# Patient Record
Sex: MALE | Race: WHITE | NOT HISPANIC OR LATINO | URBAN - METROPOLITAN AREA
[De-identification: names, ages, dates, MRNs, and addresses within clinical notes are randomized per-mention and may not be internally consistent; named-entity substitution may affect disease eponyms.]

---

## 2017-02-06 ENCOUNTER — OUTPATIENT (OUTPATIENT)
Dept: OUTPATIENT SERVICES | Facility: HOSPITAL | Age: 57
LOS: 1 days | Discharge: HOME | End: 2017-02-06

## 2017-06-06 ENCOUNTER — APPOINTMENT (OUTPATIENT)
Dept: HEMATOLOGY ONCOLOGY | Facility: CLINIC | Age: 57
End: 2017-06-06

## 2017-06-06 ENCOUNTER — RESULT REVIEW (OUTPATIENT)
Age: 57
End: 2017-06-06

## 2017-06-06 ENCOUNTER — OUTPATIENT (OUTPATIENT)
Dept: OUTPATIENT SERVICES | Facility: HOSPITAL | Age: 57
LOS: 1 days | Discharge: HOME | End: 2017-06-06

## 2017-06-06 VITALS
RESPIRATION RATE: 14 BRPM | BODY MASS INDEX: 25.58 KG/M2 | WEIGHT: 162.99 LBS | TEMPERATURE: 97.6 F | HEART RATE: 69 BPM | DIASTOLIC BLOOD PRESSURE: 78 MMHG | SYSTOLIC BLOOD PRESSURE: 130 MMHG | HEIGHT: 67 IN

## 2017-06-06 DIAGNOSIS — I82.409 ACUTE EMBOLISM AND THROMBOSIS OF UNSPECIFIED DEEP VEINS OF UNSPECIFIED LOWER EXTREMITY: ICD-10-CM

## 2017-06-07 LAB
ANION GAP SERPL CALC-SCNC: 11 MEQ/L
BASOPHILS # BLD: 0.03 TH/MM3
BASOPHILS NFR BLD: 0.4 %
BUN SERPL-MCNC: 15 MG/DL
BUN/CREAT SERPL: 16.7 %
CALCIUM SERPL-MCNC: 9.3 MG/DL
CHLORIDE SERPL-SCNC: 102 MEQ/L
CO2 SERPL-SCNC: 29 MEQ/L
CREAT SERPL-MCNC: 0.9 MG/DL
EOSINOPHIL # BLD: 0.1 TH/MM3
EOSINOPHIL NFR BLD: 1.3 %
ERYTHROCYTE [DISTWIDTH] IN BLOOD BY AUTOMATED COUNT: 13 %
GFR SERPL CREATININE-BSD FRML MDRD: 87
GLUCOSE SERPL-MCNC: 76 MG/DL
GRANULOCYTES # BLD: 4.71 TH/MM3
GRANULOCYTES NFR BLD: 62.8 %
HCT VFR BLD AUTO: 44 %
HGB BLD-MCNC: 15.7 G/DL
IMM GRANULOCYTES # BLD: 0.01 TH/MM3
IMM GRANULOCYTES NFR BLD: 0.1 %
LYMPHOCYTES # BLD: 1.9 TH/MM3
LYMPHOCYTES NFR BLD: 25.3 %
MCH RBC QN AUTO: 31.7 PG
MCHC RBC AUTO-ENTMCNC: 35.7 G/DL
MCV RBC AUTO: 88.7 FL
MONOCYTES # BLD: 0.76 TH/MM3
MONOCYTES NFR BLD: 10.1 %
PLATELET # BLD: 189 TH/MM3
PMV BLD AUTO: 10.3 FL
POTASSIUM SERPL-SCNC: 3.8 MMOL/L
RBC # BLD AUTO: 4.96 MIL/MM3
SODIUM SERPL-SCNC: 142 MEQ/L
WBC # BLD: 7.51 TH/MM3

## 2017-06-28 DIAGNOSIS — I63.9 CEREBRAL INFARCTION, UNSPECIFIED: ICD-10-CM

## 2017-07-05 ENCOUNTER — APPOINTMENT (OUTPATIENT)
Dept: CARDIOTHORACIC SURGERY | Facility: CLINIC | Age: 57
End: 2017-07-05

## 2017-07-12 ENCOUNTER — APPOINTMENT (OUTPATIENT)
Dept: CARDIOTHORACIC SURGERY | Facility: CLINIC | Age: 57
End: 2017-07-12

## 2017-09-13 ENCOUNTER — OUTPATIENT (OUTPATIENT)
Dept: OUTPATIENT SERVICES | Facility: HOSPITAL | Age: 57
LOS: 1 days | Discharge: HOME | End: 2017-09-13

## 2017-09-13 DIAGNOSIS — Q21.1 ATRIAL SEPTAL DEFECT: ICD-10-CM

## 2017-09-13 DIAGNOSIS — I82.409 ACUTE EMBOLISM AND THROMBOSIS OF UNSPECIFIED DEEP VEINS OF UNSPECIFIED LOWER EXTREMITY: ICD-10-CM

## 2017-09-13 DIAGNOSIS — Z01.818 ENCOUNTER FOR OTHER PREPROCEDURAL EXAMINATION: ICD-10-CM

## 2017-09-13 DIAGNOSIS — Z00.00 ENCOUNTER FOR GENERAL ADULT MEDICAL EXAMINATION WITHOUT ABNORMAL FINDINGS: ICD-10-CM

## 2017-09-26 ENCOUNTER — APPOINTMENT (OUTPATIENT)
Dept: CARDIOTHORACIC SURGERY | Facility: HOSPITAL | Age: 57
End: 2017-09-26
Payer: COMMERCIAL

## 2017-09-26 ENCOUNTER — INPATIENT (INPATIENT)
Facility: HOSPITAL | Age: 57
LOS: 0 days | Discharge: HOME | DRG: 229 | End: 2017-09-27
Attending: INTERNAL MEDICINE | Admitting: INTERNAL MEDICINE
Payer: COMMERCIAL

## 2017-09-26 VITALS — TEMPERATURE: 97 F

## 2017-09-26 LAB
ALBUMIN SERPL ELPH-MCNC: 3.8 G/DL — SIGNIFICANT CHANGE UP (ref 3.3–5)
ALBUMIN SERPL ELPH-MCNC: 4.2 G/DL — SIGNIFICANT CHANGE UP (ref 3.3–5)
ALP SERPL-CCNC: 65 U/L — SIGNIFICANT CHANGE UP (ref 40–120)
ALP SERPL-CCNC: 68 U/L — SIGNIFICANT CHANGE UP (ref 40–120)
ALT FLD-CCNC: 30 U/L — SIGNIFICANT CHANGE UP (ref 10–45)
ALT FLD-CCNC: 33 U/L — SIGNIFICANT CHANGE UP (ref 10–45)
ANION GAP SERPL CALC-SCNC: 11 MMOL/L — SIGNIFICANT CHANGE UP (ref 5–17)
ANION GAP SERPL CALC-SCNC: 11 MMOL/L — SIGNIFICANT CHANGE UP (ref 5–17)
APTT BLD: 30.2 SEC — SIGNIFICANT CHANGE UP (ref 27.5–37.4)
APTT BLD: 35.6 SEC — SIGNIFICANT CHANGE UP (ref 27.5–37.4)
AST SERPL-CCNC: 23 U/L — SIGNIFICANT CHANGE UP (ref 10–40)
AST SERPL-CCNC: 25 U/L — SIGNIFICANT CHANGE UP (ref 10–40)
BASOPHILS NFR BLD AUTO: 0.1 % — SIGNIFICANT CHANGE UP (ref 0–2)
BILIRUB SERPL-MCNC: 0.9 MG/DL — SIGNIFICANT CHANGE UP (ref 0.2–1.2)
BILIRUB SERPL-MCNC: 1 MG/DL — SIGNIFICANT CHANGE UP (ref 0.2–1.2)
BLD GP AB SCN SERPL QL: NEGATIVE — SIGNIFICANT CHANGE UP
BUN SERPL-MCNC: 13 MG/DL — SIGNIFICANT CHANGE UP (ref 7–23)
BUN SERPL-MCNC: 14 MG/DL — SIGNIFICANT CHANGE UP (ref 7–23)
CALCIUM SERPL-MCNC: 8.8 MG/DL — SIGNIFICANT CHANGE UP (ref 8.4–10.5)
CALCIUM SERPL-MCNC: 9 MG/DL — SIGNIFICANT CHANGE UP (ref 8.4–10.5)
CHLORIDE SERPL-SCNC: 104 MMOL/L — SIGNIFICANT CHANGE UP (ref 96–108)
CHLORIDE SERPL-SCNC: 105 MMOL/L — SIGNIFICANT CHANGE UP (ref 96–108)
CO2 SERPL-SCNC: 25 MMOL/L — SIGNIFICANT CHANGE UP (ref 22–31)
CO2 SERPL-SCNC: 25 MMOL/L — SIGNIFICANT CHANGE UP (ref 22–31)
CREAT SERPL-MCNC: 0.93 MG/DL — SIGNIFICANT CHANGE UP (ref 0.5–1.3)
CREAT SERPL-MCNC: 0.95 MG/DL — SIGNIFICANT CHANGE UP (ref 0.5–1.3)
EOSINOPHIL NFR BLD AUTO: 0.7 % — SIGNIFICANT CHANGE UP (ref 0–6)
GLUCOSE SERPL-MCNC: 122 MG/DL — HIGH (ref 70–99)
GLUCOSE SERPL-MCNC: 134 MG/DL — HIGH (ref 70–99)
HBA1C BLD-MCNC: 5.1 % — SIGNIFICANT CHANGE UP (ref 4–5.6)
HCT VFR BLD CALC: 39.5 % — SIGNIFICANT CHANGE UP (ref 39–50)
HCT VFR BLD CALC: 42.3 % — SIGNIFICANT CHANGE UP (ref 39–50)
HGB BLD-MCNC: 14 G/DL — SIGNIFICANT CHANGE UP (ref 13–17)
HGB BLD-MCNC: 15 G/DL — SIGNIFICANT CHANGE UP (ref 13–17)
INR BLD: 1.09 — SIGNIFICANT CHANGE UP (ref 0.88–1.16)
INR BLD: 1.12 — SIGNIFICANT CHANGE UP (ref 0.88–1.16)
LYMPHOCYTES # BLD AUTO: 19.4 % — SIGNIFICANT CHANGE UP (ref 13–44)
MAGNESIUM SERPL-MCNC: 1.9 MG/DL — SIGNIFICANT CHANGE UP (ref 1.6–2.6)
MAGNESIUM SERPL-MCNC: 1.9 MG/DL — SIGNIFICANT CHANGE UP (ref 1.6–2.6)
MCHC RBC-ENTMCNC: 31.6 PG — SIGNIFICANT CHANGE UP (ref 27–34)
MCHC RBC-ENTMCNC: 32.1 PG — SIGNIFICANT CHANGE UP (ref 27–34)
MCHC RBC-ENTMCNC: 35.4 G/DL — SIGNIFICANT CHANGE UP (ref 32–36)
MCHC RBC-ENTMCNC: 35.5 G/DL — SIGNIFICANT CHANGE UP (ref 32–36)
MCV RBC AUTO: 89.2 FL — SIGNIFICANT CHANGE UP (ref 80–100)
MCV RBC AUTO: 90.6 FL — SIGNIFICANT CHANGE UP (ref 80–100)
MONOCYTES NFR BLD AUTO: 6.1 % — SIGNIFICANT CHANGE UP (ref 2–14)
NEUTROPHILS NFR BLD AUTO: 73.7 % — SIGNIFICANT CHANGE UP (ref 43–77)
NT-PROBNP SERPL-SCNC: 19 PG/ML — SIGNIFICANT CHANGE UP (ref 0–300)
PHOSPHATE SERPL-MCNC: 4.1 MG/DL — SIGNIFICANT CHANGE UP (ref 2.5–4.5)
PLATELET # BLD AUTO: 132 K/UL — LOW (ref 150–400)
PLATELET # BLD AUTO: 151 K/UL — SIGNIFICANT CHANGE UP (ref 150–400)
POTASSIUM SERPL-MCNC: 4 MMOL/L — SIGNIFICANT CHANGE UP (ref 3.5–5.3)
POTASSIUM SERPL-MCNC: 4 MMOL/L — SIGNIFICANT CHANGE UP (ref 3.5–5.3)
POTASSIUM SERPL-SCNC: 4 MMOL/L — SIGNIFICANT CHANGE UP (ref 3.5–5.3)
POTASSIUM SERPL-SCNC: 4 MMOL/L — SIGNIFICANT CHANGE UP (ref 3.5–5.3)
PROT SERPL-MCNC: 6.3 G/DL — SIGNIFICANT CHANGE UP (ref 6–8.3)
PROT SERPL-MCNC: 7.1 G/DL — SIGNIFICANT CHANGE UP (ref 6–8.3)
PROTHROM AB SERPL-ACNC: 12.1 SEC — SIGNIFICANT CHANGE UP (ref 9.8–12.7)
PROTHROM AB SERPL-ACNC: 12.5 SEC — SIGNIFICANT CHANGE UP (ref 9.8–12.7)
RBC # BLD: 4.43 M/UL — SIGNIFICANT CHANGE UP (ref 4.2–5.8)
RBC # BLD: 4.67 M/UL — SIGNIFICANT CHANGE UP (ref 4.2–5.8)
RBC # FLD: 12.4 % — SIGNIFICANT CHANGE UP (ref 10.3–16.9)
RBC # FLD: 12.7 % — SIGNIFICANT CHANGE UP (ref 10.3–16.9)
RH IG SCN BLD-IMP: POSITIVE — SIGNIFICANT CHANGE UP
SODIUM SERPL-SCNC: 140 MMOL/L — SIGNIFICANT CHANGE UP (ref 135–145)
SODIUM SERPL-SCNC: 141 MMOL/L — SIGNIFICANT CHANGE UP (ref 135–145)
TSH SERPL-MCNC: 0.84 UIU/ML — SIGNIFICANT CHANGE UP (ref 0.35–4.94)
WBC # BLD: 5.2 K/UL — SIGNIFICANT CHANGE UP (ref 3.8–10.5)
WBC # BLD: 7.7 K/UL — SIGNIFICANT CHANGE UP (ref 3.8–10.5)
WBC # FLD AUTO: 5.2 K/UL — SIGNIFICANT CHANGE UP (ref 3.8–10.5)
WBC # FLD AUTO: 7.7 K/UL — SIGNIFICANT CHANGE UP (ref 3.8–10.5)

## 2017-09-26 PROCEDURE — 93580 TRANSCATH CLOSURE OF ASD: CPT

## 2017-09-26 PROCEDURE — 71010: CPT | Mod: 26

## 2017-09-26 PROCEDURE — 93010 ELECTROCARDIOGRAM REPORT: CPT

## 2017-09-26 RX ORDER — HEPARIN SODIUM 5000 [USP'U]/ML
5000 INJECTION INTRAVENOUS; SUBCUTANEOUS ONCE
Qty: 0 | Refills: 0 | Status: COMPLETED | OUTPATIENT
Start: 2017-09-26 | End: 2017-09-26

## 2017-09-26 RX ORDER — CLOPIDOGREL BISULFATE 75 MG/1
300 TABLET, FILM COATED ORAL ONCE
Qty: 0 | Refills: 0 | Status: COMPLETED | OUTPATIENT
Start: 2017-09-26 | End: 2017-09-26

## 2017-09-26 RX ORDER — SODIUM CHLORIDE 9 MG/ML
1000 INJECTION, SOLUTION INTRAVENOUS
Qty: 0 | Refills: 0 | Status: DISCONTINUED | OUTPATIENT
Start: 2017-09-26 | End: 2017-09-27

## 2017-09-26 RX ORDER — FAMOTIDINE 10 MG/ML
20 INJECTION INTRAVENOUS
Qty: 0 | Refills: 0 | Status: DISCONTINUED | OUTPATIENT
Start: 2017-09-26 | End: 2017-09-27

## 2017-09-26 RX ORDER — MEPERIDINE HYDROCHLORIDE 50 MG/ML
25 INJECTION INTRAMUSCULAR; INTRAVENOUS; SUBCUTANEOUS ONCE
Qty: 0 | Refills: 0 | Status: DISCONTINUED | OUTPATIENT
Start: 2017-09-26 | End: 2017-09-27

## 2017-09-26 RX ORDER — HEPARIN SODIUM 5000 [USP'U]/ML
5000 INJECTION INTRAVENOUS; SUBCUTANEOUS EVERY 8 HOURS
Qty: 0 | Refills: 0 | Status: DISCONTINUED | OUTPATIENT
Start: 2017-09-27 | End: 2017-09-27

## 2017-09-26 RX ORDER — MAGNESIUM OXIDE 400 MG ORAL TABLET 241.3 MG
400 TABLET ORAL ONCE
Qty: 0 | Refills: 0 | Status: COMPLETED | OUTPATIENT
Start: 2017-09-26 | End: 2017-09-26

## 2017-09-26 RX ORDER — ASPIRIN/CALCIUM CARB/MAGNESIUM 324 MG
81 TABLET ORAL DAILY
Qty: 0 | Refills: 0 | Status: DISCONTINUED | OUTPATIENT
Start: 2017-09-27 | End: 2017-09-27

## 2017-09-26 RX ORDER — CLOPIDOGREL BISULFATE 75 MG/1
75 TABLET, FILM COATED ORAL DAILY
Qty: 0 | Refills: 0 | Status: DISCONTINUED | OUTPATIENT
Start: 2017-09-27 | End: 2017-09-27

## 2017-09-26 RX ORDER — ASPIRIN/CALCIUM CARB/MAGNESIUM 324 MG
325 TABLET ORAL ONCE
Qty: 0 | Refills: 0 | Status: COMPLETED | OUTPATIENT
Start: 2017-09-26 | End: 2017-09-26

## 2017-09-26 RX ORDER — CEFAZOLIN SODIUM 1 G
2000 VIAL (EA) INJECTION EVERY 8 HOURS
Qty: 0 | Refills: 0 | Status: COMPLETED | OUTPATIENT
Start: 2017-09-26 | End: 2017-09-27

## 2017-09-26 RX ADMIN — CLOPIDOGREL BISULFATE 300 MILLIGRAM(S): 75 TABLET, FILM COATED ORAL at 15:00

## 2017-09-26 RX ADMIN — FAMOTIDINE 20 MILLIGRAM(S): 10 INJECTION INTRAVENOUS at 18:29

## 2017-09-26 RX ADMIN — Medication 325 MILLIGRAM(S): at 15:00

## 2017-09-26 RX ADMIN — Medication 100 MILLIGRAM(S): at 18:36

## 2017-09-26 RX ADMIN — HEPARIN SODIUM 5000 UNIT(S): 5000 INJECTION INTRAVENOUS; SUBCUTANEOUS at 22:48

## 2017-09-26 RX ADMIN — MAGNESIUM OXIDE 400 MG ORAL TABLET 400 MILLIGRAM(S): 241.3 TABLET ORAL at 18:29

## 2017-09-26 NOTE — PROGRESS NOTE ADULT - SUBJECTIVE AND OBJECTIVE BOX
Post operative progress note     Operation / Date:  9/26/17 PFO closure     SUBJECTIVE ASSESSMENT:  Patient seen in cath lab holding this afternoon, patient states he is sleeping but overall feeling ok, denies any chest pain, shortness of breath or palpitations.     Vital Signs Last 24 Hrs  T(C): --  T(F): --  HR: --  BP: --  BP(mean): --  RR: --  SpO2: --  I&O's Detail      CHEST TUBE:  No  DHAVAL DRAIN: No  EPICARDIAL WIRES: No  TIE DOWNS: Yes   AGUAYO: No    PHYSICAL EXAM:    General: Patient lying comfortably in bed, no acute distress     Neurological: Alert and oriented. Unable to assess LE weakness/strength 2/2 bedrest post cath. No focal neurological deficits noted     Cardiovascular: S1S2, RRR, no murmurs appreciated on exam     Respiratory: Apices clear to ausculation, unable to assess posterior lung fields 2/2 bed rest     Gastrointestinal: Abdomen soft, non tender, non distended.      Extremities: Warm and well perfused. No edema or calf tenderness bilaterally     Vascular: Peripheral pulses 2+ bilaterally     Incision Sites: right groin incision with dressing intact, clean and dry. No hematoma.     LABS:                        15.0   5.2   )-----------( 151      ( 26 Sep 2017 10:11 )             42.3       COUMADIN:  No    PT/INR - ( 26 Sep 2017 10:11 )   PT: 12.1 sec;   INR: 1.09          PTT - ( 26 Sep 2017 10:11 )  PTT:30.2 sec    09-26    140  |  104  |  14  ----------------------------<  122<H>  4.0   |  25  |  0.95    Ca    9.0      26 Sep 2017 10:11  Mg     1.9     09-26    TPro  7.1  /  Alb  4.2  /  TBili  1.0  /  DBili  x   /  AST  25  /  ALT  33  /  AlkPhos  68  09-26    MEDICATIONS  (STANDING):  sodium chloride 0.45%. 1000 milliLiter(s) (10 mL/Hr) IV Continuous <Continuous>  ceFAZolin   IVPB 2000 milliGRAM(s) IV Intermittent every 8 hours  famotidine    Tablet 20 milliGRAM(s) Oral two times a day  aspirin enteric coated 325 milliGRAM(s) Oral once  clopidogrel Tablet 300 milliGRAM(s) Oral once    MEDICATIONS  (PRN):  meperidine     Injectable 25 milliGRAM(s) IV Push once PRN For Shivering    RADIOLOGY & ADDITIONAL TESTS:  9/26/17 CXR: pending official read, clear lungs     A/P: 56 year old male, with no significant past medical history prior to recent CVA with bilateral pulmonary emboli s/p tPA and attempted embolectomy in April 2016 with subsequent workup revealing a patent foramen ovale evaluated by Dr. Keating as an outpatient and presented today for elective procedure. Patient underwent PFO closure, procedure uncomplicated. Patient evaluated in cath lab holding, doing well.     Neurovascular: Hx of CVA with no residual deficits   - no active issues     Cardiovascular: Hemodynamically stable. HR controlled.  - s/p PFO closure, as per Dr. Keating, asa 325 and plavix 300mg today will begin asa 81mg and plavix 75mg tomorrow.     Respiratory:   - Encourage C+DB and Use of IS 10x / hr while awake.  - CXR stable, f./u CXR in AM   - Bilateral PE, on pradaxa at home, will confirm with Dr. Keating when to resume.     GI: Stable.  - PO diet   - pepcid 20mg for GI ppx     Renal / :   -Monitor renal function.  -Monitor I/O's.    Endocrine:    -A1c.  -TSH.    Hematologic:  -CBC.  -Coagulation Panel.    ID: continue periop abx     Prophylaxis:  -DVT prophylaxis with 5000 SubQ Heparin q8h to start tomorrow   -SCD's    Disposition: Admit to 9L post procedure.

## 2017-09-26 NOTE — H&P ADULT - NSHPREVIEWOFSYSTEMS_GEN_ALL_CORE
Constitutional: feeling fatigued, but no fever, no headache, and no chills  Cardiovascular: no shortness of breath, no dyspnea during exertion, no chest pressure, no chest pain, no extremity edema, no intermittent leg claudication and no palpitation  Eyes, ENT, Respiratory, GI, , Integumentary, Neurological and Psychiatric are otherwise negative.

## 2017-09-26 NOTE — BRIEF OPERATIVE NOTE - PROCEDURE
<<-----Click on this checkbox to enter Procedure Patent foramen ovale repair  09/26/2017    Active  CKLIGER

## 2017-09-26 NOTE — H&P ADULT - NSHPPHYSICALEXAM_GEN_ALL_CORE
Constitutional: normal appearance and no acute distress    Neck: normal jugular venous A waves present, normal jugular venous V waves present and no jugular sinha A waves    Pulmonary:  no respiratory distress, normal respiratory rhythm and effort, no accessory muscle use and lungs were clear to auscultation bilaterally    Cardiovascular:  heart rate and rhythm were normal, normal S1 and S2, no murmurs present, the arterial pulses were normal and no peripheral edema were present.    Abdomen: normal bowel sounds, soft and non-tender    MSK: normal gait    Psych: oriented to person, place, and time

## 2017-09-26 NOTE — H&P ADULT - HISTORY OF PRESENT ILLNESS
56 y/oM with no signif medical hx with a recent CVA with bilateral pulmonary emboli s/p tPA and attempted embolectomy in April 2016 at [facility] with subsequent workup revealing a patent foramen ovale who presents for follow up.    Since his last visit, the patient was evaluated with Dr. Mane.  He has recommended continuing the PRadaxa indefinitly.  the patient has had an extensive genetic and acquired thrombophilic and hypercoagulable workup that was completely negative.    the patient is feeling well.  he has no residual neuro deficits.  he denies chest pain, SOB, STEVEN, orthopnea, PND, dizziness, syncope, LE edema, and palpitations.  On morning of procedure he is in his usual state of health

## 2017-09-26 NOTE — H&P ADULT - NSHPLABSRESULTS_GEN_ALL_CORE
A/P: 55y/o M with no significant medical history with a recent CVA with bilateral pumonary emboli s/p tPA and attempted embolectomy in April 2016 at [facility] with subsequent workup revealing a patent formaen ovale who presents for PFO closure   - pt last took pradaxa on Saturday night and has been on lovenox bridge since   - type and screen x 2   - blood and products on hold for OR   - full set of labs and EKG

## 2017-09-26 NOTE — BRIEF OPERATIVE NOTE - OPERATION/FINDINGS
10F long rt CFV, 9F short rt CFV sheaths  venogram without evidence of iliofemoral/IVC thrombus/filling defect  ICE performed with 4-5mm small ASD/PFO visualized, rt-lt shunting  25mm PFO Amplatzer Occluder device positioned/deployed across the PFO  bubble study revealed no shunt  hemostasis via mattress suture  imp: cryptogenic CVA; PFO s/p successful closure with 25mm PFO Amplatzer Occluder  plan:  -routine post-catheterization care  -asa 325mg and plavix 300mg today, then asa 81mg, plavix 75mg daily  -d/c anticoagulation  -TTE tomorrow am  -IV abx per protocol

## 2017-09-26 NOTE — BRIEF OPERATIVE NOTE - PRE-OP DX
Cryptogenic stroke  09/26/2017    Active  Nikos Keating  Patent foramen ovale with right to left shunt  09/26/2017    Nikos Flores

## 2017-09-26 NOTE — H&P ADULT - PMH
Cerebrovascular accident (CVA) due to embolism    PE (pulmonary thromboembolism)    PFO (patent foramen ovale)

## 2017-09-27 ENCOUNTER — TRANSCRIPTION ENCOUNTER (OUTPATIENT)
Age: 57
End: 2017-09-27

## 2017-09-27 VITALS — DIASTOLIC BLOOD PRESSURE: 66 MMHG | HEART RATE: 68 BPM | SYSTOLIC BLOOD PRESSURE: 101 MMHG

## 2017-09-27 LAB
ANION GAP SERPL CALC-SCNC: 12 MMOL/L — SIGNIFICANT CHANGE UP (ref 5–17)
BUN SERPL-MCNC: 16 MG/DL — SIGNIFICANT CHANGE UP (ref 7–23)
CALCIUM SERPL-MCNC: 8.8 MG/DL — SIGNIFICANT CHANGE UP (ref 8.4–10.5)
CHLORIDE SERPL-SCNC: 103 MMOL/L — SIGNIFICANT CHANGE UP (ref 96–108)
CO2 SERPL-SCNC: 25 MMOL/L — SIGNIFICANT CHANGE UP (ref 22–31)
CREAT SERPL-MCNC: 1.05 MG/DL — SIGNIFICANT CHANGE UP (ref 0.5–1.3)
GLUCOSE SERPL-MCNC: 109 MG/DL — HIGH (ref 70–99)
HCT VFR BLD CALC: 40 % — SIGNIFICANT CHANGE UP (ref 39–50)
HGB BLD-MCNC: 14.3 G/DL — SIGNIFICANT CHANGE UP (ref 13–17)
MAGNESIUM SERPL-MCNC: 2.2 MG/DL — SIGNIFICANT CHANGE UP (ref 1.6–2.6)
MCHC RBC-ENTMCNC: 32.3 PG — SIGNIFICANT CHANGE UP (ref 27–34)
MCHC RBC-ENTMCNC: 35.8 G/DL — SIGNIFICANT CHANGE UP (ref 32–36)
MCV RBC AUTO: 90.3 FL — SIGNIFICANT CHANGE UP (ref 80–100)
PLATELET # BLD AUTO: 138 K/UL — LOW (ref 150–400)
POTASSIUM SERPL-MCNC: 4 MMOL/L — SIGNIFICANT CHANGE UP (ref 3.5–5.3)
POTASSIUM SERPL-SCNC: 4 MMOL/L — SIGNIFICANT CHANGE UP (ref 3.5–5.3)
RBC # BLD: 4.43 M/UL — SIGNIFICANT CHANGE UP (ref 4.2–5.8)
RBC # FLD: 12.8 % — SIGNIFICANT CHANGE UP (ref 10.3–16.9)
SODIUM SERPL-SCNC: 140 MMOL/L — SIGNIFICANT CHANGE UP (ref 135–145)
WBC # BLD: 6.8 K/UL — SIGNIFICANT CHANGE UP (ref 3.8–10.5)
WBC # FLD AUTO: 6.8 K/UL — SIGNIFICANT CHANGE UP (ref 3.8–10.5)

## 2017-09-27 PROCEDURE — 36415 COLL VENOUS BLD VENIPUNCTURE: CPT

## 2017-09-27 PROCEDURE — 83036 HEMOGLOBIN GLYCOSYLATED A1C: CPT

## 2017-09-27 PROCEDURE — C1759: CPT

## 2017-09-27 PROCEDURE — 86850 RBC ANTIBODY SCREEN: CPT

## 2017-09-27 PROCEDURE — 93010 ELECTROCARDIOGRAM REPORT: CPT

## 2017-09-27 PROCEDURE — 85610 PROTHROMBIN TIME: CPT

## 2017-09-27 PROCEDURE — 93306 TTE W/DOPPLER COMPLETE: CPT

## 2017-09-27 PROCEDURE — 84443 ASSAY THYROID STIM HORMONE: CPT

## 2017-09-27 PROCEDURE — 85027 COMPLETE CBC AUTOMATED: CPT

## 2017-09-27 PROCEDURE — C1887: CPT

## 2017-09-27 PROCEDURE — C1894: CPT

## 2017-09-27 PROCEDURE — 71010: CPT | Mod: 26

## 2017-09-27 PROCEDURE — C1817: CPT

## 2017-09-27 PROCEDURE — 80053 COMPREHEN METABOLIC PANEL: CPT

## 2017-09-27 PROCEDURE — 85025 COMPLETE CBC W/AUTO DIFF WBC: CPT

## 2017-09-27 PROCEDURE — 83880 ASSAY OF NATRIURETIC PEPTIDE: CPT

## 2017-09-27 PROCEDURE — 86901 BLOOD TYPING SEROLOGIC RH(D): CPT

## 2017-09-27 PROCEDURE — 86900 BLOOD TYPING SEROLOGIC ABO: CPT

## 2017-09-27 PROCEDURE — C1769: CPT

## 2017-09-27 PROCEDURE — 93306 TTE W/DOPPLER COMPLETE: CPT | Mod: 26

## 2017-09-27 PROCEDURE — 93005 ELECTROCARDIOGRAM TRACING: CPT

## 2017-09-27 PROCEDURE — 83735 ASSAY OF MAGNESIUM: CPT

## 2017-09-27 PROCEDURE — 84100 ASSAY OF PHOSPHORUS: CPT

## 2017-09-27 PROCEDURE — 85730 THROMBOPLASTIN TIME PARTIAL: CPT

## 2017-09-27 PROCEDURE — 71045 X-RAY EXAM CHEST 1 VIEW: CPT

## 2017-09-27 PROCEDURE — 86923 COMPATIBILITY TEST ELECTRIC: CPT

## 2017-09-27 PROCEDURE — 80048 BASIC METABOLIC PNL TOTAL CA: CPT

## 2017-09-27 RX ORDER — DABIGATRAN ETEXILATE MESYLATE 150 MG/1
1 CAPSULE ORAL
Qty: 0 | Refills: 0 | COMMUNITY

## 2017-09-27 RX ORDER — CLOPIDOGREL BISULFATE 75 MG/1
1 TABLET, FILM COATED ORAL
Qty: 30 | Refills: 0
Start: 2017-09-27 | End: 2017-10-27

## 2017-09-27 RX ORDER — ASPIRIN/CALCIUM CARB/MAGNESIUM 324 MG
1 TABLET ORAL
Qty: 30 | Refills: 0
Start: 2017-09-27 | End: 2017-10-27

## 2017-09-27 RX ORDER — FAMOTIDINE 10 MG/ML
1 INJECTION INTRAVENOUS
Qty: 60 | Refills: 0
Start: 2017-09-27 | End: 2017-10-27

## 2017-09-27 RX ADMIN — Medication 81 MILLIGRAM(S): at 12:04

## 2017-09-27 RX ADMIN — CLOPIDOGREL BISULFATE 75 MILLIGRAM(S): 75 TABLET, FILM COATED ORAL at 12:04

## 2017-09-27 RX ADMIN — Medication 100 MILLIGRAM(S): at 03:39

## 2017-09-27 RX ADMIN — HEPARIN SODIUM 5000 UNIT(S): 5000 INJECTION INTRAVENOUS; SUBCUTANEOUS at 06:17

## 2017-09-27 RX ADMIN — FAMOTIDINE 20 MILLIGRAM(S): 10 INJECTION INTRAVENOUS at 06:17

## 2017-09-27 NOTE — DISCHARGE NOTE ADULT - HOSPITAL COURSE
56 year old male, no significant past medical history prior to recent CVA with bilateral pulmonary emboli s/p tPA and attempted embolectomy  4/2016 with subsequent     56 year old male, with no significant past medical history prior to recent CVA with bilateral pulmonary emboli s/p tPA and attempted embolectomy in April 2016 with subsequent workup revealing a patent foramen ovale evaluated by Dr. Keating as an outpatient and presented today for elective procedure. Patient underwent PFO closure, procedure uncomplicated. Patient evaluated in cath lab holding, doing well. 56 year old male, no significant past medical history prior to recent CVA with bilateral pulmonary emboli s/p tPA and attempted embolectomy  4/2016 at [facility].  His workup revealed a patent foramen ovale and he was evaluated by Dr. Keating as an outpatient.  He presented to [facility] on 9/26/17 for elective PFO closure.  Procedure was uncomplicated.  Patient brought from cath lab holding to 9[facility] POD0.  Today POD1, patient is ambulating in hallway, and tolerating PO diet, with no acute complaints.  As per Dr. Keating, patient ready for discharge. 56 year old male, no significant past medical history prior to recent CVA with bilateral pulmonary emboli s/p tPA and attempted embolectomy  4/2016 at [facility].  His workup revealed a patent foramen ovale and he was evaluated by Dr. Keating as an outpatient.  He presented to [facility] on 9/26/17 for elective PFO closure.  Procedure was uncomplicated.  Patient brought from cath lab holding to 9[facility] POD0.  Today POD1, patient is ambulating in hallway, and tolerating PO diet, with no acute complaints.  As per Dr. Keating, patient ready for discharge. Hypercoaguable workup was completely negative; discussed with Dr. Mane. Plan for DAPT post-closure for 6mo then ASA monotherapy thereafter.

## 2017-09-27 NOTE — DISCHARGE NOTE ADULT - CARE PROVIDER_API CALL
Nikos Keating), Cardiology; Internal Medicine; Interventional Cardiology  [address]  4th [facility], [place] [address]  Phone: [phone]  Fax: [phone]

## 2017-09-27 NOTE — DISCHARGE NOTE ADULT - CARE PROVIDERS DIRECT ADDRESSES
[email or web address]

## 2017-09-27 NOTE — DISCHARGE NOTE ADULT - PLAN OF CARE
to recover from surgery -Please follow up with Dr. Keating on 10/9/17 at 10:30am. The office is located at [facility], [facility], 4th floor. Call us with any questions  #[phone].    -Walk daily as tolerated and use your incentive spirometer every hour.    -No driving or strenuous activity/exercise for 6 weeks, or until  cleared by your surgeon.    -Gently clean your incisions with anti-bacterial soap and water, pat  dry.  You may leave them open to air.    -Call your doctor if you have shortness of breath, chest pain not  relieved by pain medication, dizziness, fever >101.5, or increased  redness or drainage from incisions. -Please follow up with Dr. Keating on 10/9/17 at 10:30am. The office is located at [facility], [facility], 4th floor. Call us with any questions  #[phone].  -Walk daily and use incentive spirometer.   -Gently clean your incisions with anti-bacterial soap and water, pat  dry.  You may leave them open to air.    -Call your doctor if you have shortness of breath, chest pain not  relieved by pain medication, dizziness, fever >101.5, or increased  redness or drainage from incisions. -Please follow up with Dr. Keating on 10/9/17 at 10:30am. The office is located at [facility], [facility], 4th floor. Call us with any questions  #[phone].  -Walk daily and use incentive spirometer.   -Gently clean your incisions with anti-bacterial soap and water, pat  dry.  You may leave them open to air.    -Call your doctor if you have shortness of breath, chest pain not  relieved by pain medication, dizziness, fever >101.5, or increased  redness or drainage from incisions.    -Please follow up with your primary care provider within 1-2 weeks of discharge.

## 2017-09-27 NOTE — PROGRESS NOTE ADULT - SUBJECTIVE AND OBJECTIVE BOX
Patient discussed on morning rounds with Dr. Keating    Operation / Date: PFO closure 9/26/17    Surgeon: Dr. Keating    Referring Physician: Dr. Carlos Payne     SUBJECTIVE ASSESSMENT:  57y Male seen and examined. Today patient has no acute complaints.  He is ambulating in hallway, tolerating PO diet, and his last BM was     Hospital Course:    Vital Signs Last 24 Hrs  T(C): 36.4 (27 Sep 2017 05:03), Max: 36.6 (27 Sep 2017 00:33)  T(F): 300 (27 Sep 2017 05:19), Max: 300 (27 Sep 2017 05:19)  HR: 76 (27 Sep 2017 09:07) (50 - 76)  BP: 82/49 (27 Sep 2017 09:07) (82/49 - 110/67)  BP(mean): 58 (27 Sep 2017 09:07) (58 - 79)  RR: 18 (27 Sep 2017 05:33) (16 - 18)  SpO2: 96% (27 Sep 2017 09:07) (95% - 98%)  I&O's Detail    26 Sep 2017 07:01  -  27 Sep 2017 07:00  --------------------------------------------------------  IN:    IV PiggyBack: 100 mL    Oral Fluid: 150 mL    sodium chloride 0.45%.: 140 mL  Total IN: 390 mL    OUT:    Voided: 1300 mL  Total OUT: 1300 mL    Total NET: -910 mL      27 Sep 2017 07:01  -  27 Sep 2017 09:36  --------------------------------------------------------  IN:  Total IN: 0 mL    OUT:    Voided: 200 mL  Total OUT: 200 mL    Total NET: -200 mL          EPICARDIAL WIRES REMOVED: Yes  TIE DOWNS REMOVED: Yes    PHYSICAL EXAM:    General:  sitting comfortably in chair, no acute distress    Neurological: awake alert and oriented, no neuro deficits     Cardiovascular: S1S2, RRR no murmurs heard at this time     Respiratory: Clear and equal breath sounds bilaterally no wheeze/rhonchi/rales    Gastrointestinal: +BS, soft nontender nondistended    Extremities: warm and well perfused, no edema, no calf tenderness    Vascular: 2+ DP and radial pulses bilaterally     Incision Sites: R groin incision: +mild ecchymosis, dressing CDI, no palpable hematoma     LABS:                        14.3   6.8   )-----------( 138      ( 27 Sep 2017 06:20 )             40.0       COUMADIN:  No    PT/INR - ( 26 Sep 2017 15:00 )   PT: 12.5 sec;   INR: 1.12          PTT - ( 26 Sep 2017 15:00 )  PTT:35.6 sec    09-27    140  |  103  |  16  ----------------------------<  109<H>  4.0   |  25  |  1.05    Ca    8.8      27 Sep 2017 06:20  Phos  4.1     09-26  Mg     2.2     09-27    TPro  6.3  /  Alb  3.8  /  TBili  0.9  /  DBili  x   /  AST  23  /  ALT  30  /  AlkPhos  65  09-26          MEDICATIONS  (STANDING):  sodium chloride 0.45%. 1000 milliLiter(s) (10 mL/Hr) IV Continuous <Continuous>  famotidine    Tablet 20 milliGRAM(s) Oral two times a day  aspirin enteric coated 81 milliGRAM(s) Oral daily  clopidogrel Tablet 75 milliGRAM(s) Oral daily  heparin  Injectable 5000 Unit(s) SubCutaneous every 8 hours      Discharge CXR:  CXR 9/27/17: pending official read: no obvious consolidation/pleural effusion/pneumothorax    Discharge ECHO: Patient discussed on morning rounds with Dr. Keating    Operation / Date: PFO closure 9/26/17    Surgeon: Dr. Keating    Referring Physician: Dr. Carlos Payne     SUBJECTIVE ASSESSMENT:  57y Male seen and examined. Today patient has no acute complaints.  He is ambulating in hallway, tolerating PO diet, and his last BM was     Hospital Course:  56 year old male, no significant past medical history prior to recent CVA with bilateral pulmonary emboli s/p tPA and attempted embolectomy  4/2016 at [facility].  His workup revealed a patent foramen ovale and he was evaluated by Dr. Keating as an outpatient.  He presented to [facility] on 9/26/17 for elective PFO closure.  Procedure was uncomplicated.  Patient brought from cath lab holding to [facility] POD0.  Today POD1, patient is ambulating in hallway, and tolerating PO diet, with no acute complaints. As per Dr. Keating, patient ready for discharge.     Vital Signs Last 24 Hrs  T(C): 36.4 (27 Sep 2017 05:03), Max: 36.6 (27 Sep 2017 00:33)  T(F): 300 (27 Sep 2017 05:19), Max: 300 (27 Sep 2017 05:19)  HR: 76 (27 Sep 2017 09:07) (50 - 76)  BP: 82/49 (27 Sep 2017 09:07) (82/49 - 110/67)  BP(mean): 58 (27 Sep 2017 09:07) (58 - 79)  RR: 18 (27 Sep 2017 05:33) (16 - 18)  SpO2: 96% (27 Sep 2017 09:07) (95% - 98%)  I&O's Detail    26 Sep 2017 07:01  -  27 Sep 2017 07:00  --------------------------------------------------------  IN:    IV PiggyBack: 100 mL    Oral Fluid: 150 mL    sodium chloride 0.45%.: 140 mL  Total IN: 390 mL    OUT:    Voided: 1300 mL  Total OUT: 1300 mL    Total NET: -910 mL      27 Sep 2017 07:01  -  27 Sep 2017 09:36  --------------------------------------------------------  IN:  Total IN: 0 mL    OUT:    Voided: 200 mL  Total OUT: 200 mL    Total NET: -200 mL          EPICARDIAL WIRES REMOVED: Yes  TIE DOWNS REMOVED: Yes    PHYSICAL EXAM:    General:  sitting comfortably in chair, no acute distress    Neurological: awake alert and oriented, no neuro deficits     Cardiovascular: S1S2, RRR no murmurs heard at this time     Respiratory: Clear and equal breath sounds bilaterally no wheeze/rhonchi/rales    Gastrointestinal: +BS, soft nontender nondistended    Extremities: warm and well perfused, no edema, no calf tenderness    Vascular: 2+ DP and radial pulses bilaterally     Incision Sites: R groin incision: +mild ecchymosis, dressing CDI, no palpable hematoma     LABS:                        14.3   6.8   )-----------( 138      ( 27 Sep 2017 06:20 )             40.0       COUMADIN:  No    PT/INR - ( 26 Sep 2017 15:00 )   PT: 12.5 sec;   INR: 1.12          PTT - ( 26 Sep 2017 15:00 )  PTT:35.6 sec    09-27    140  |  103  |  16  ----------------------------<  109<H>  4.0   |  25  |  1.05    Ca    8.8      27 Sep 2017 06:20  Phos  4.1     09-26  Mg     2.2     09-27    TPro  6.3  /  Alb  3.8  /  TBili  0.9  /  DBili  x   /  AST  23  /  ALT  30  /  AlkPhos  65  09-26          MEDICATIONS  (STANDING):  sodium chloride 0.45%. 1000 milliLiter(s) (10 mL/Hr) IV Continuous <Continuous>  famotidine    Tablet 20 milliGRAM(s) Oral two times a day  aspirin enteric coated 81 milliGRAM(s) Oral daily  clopidogrel Tablet 75 milliGRAM(s) Oral daily  heparin  Injectable 5000 Unit(s) SubCutaneous every 8 hours      Discharge CXR:  CXR 9/27/17: pending official read: no obvious consolidation/pleural effusion/pneumothorax    Discharge ECHO:    < from: Echocardiogram (09.27.17 @ 10:28) >  Normal left ventricular size and wall thickness. The left ventricular wall   motion is normal. The left ventricular ejection fraction is estimated to   be   55-60%  The left atrial size is normal. PFO closuredevice noted. Right   atrial   size is normal.  The right ventricle is normal in size and function.No   aortic   regurgitation noted. No hemodynamically significant valvular aortic   stenosis.   There is trace mitral regurgitation.There is trace tricuspid   regurgitation.There is no echocardiographic evidence for pulmonary   hypertension. The pulmonary artery systolic pressure is estimated to be   21   mmHg. There is no pulmonic stenosis.  No aortic root dilatation.Normal   size   aortic arch with no hemodynamic evidence for coarctationThe inferior   vena cava   is normal in size (<2.1 cm) with normal inspiratory collapse (>50%)   consistent   with normal right atrial pressure.  There is no pericardial effusion.    < end of copied text >

## 2017-09-27 NOTE — DISCHARGE NOTE ADULT - MEDICATION SUMMARY - MEDICATIONS TO TAKE
I will START or STAY ON the medications listed below when I get home from the hospital:    Aspirin Enteric Coated 81 mg oral delayed release tablet  -- 1 tab(s) by mouth once a day   -- Swallow whole.  Do not crush.  Take with food or milk.    -- Indication: For Blood thinner    clopidogrel 75 mg oral tablet  -- 1 tab(s) by mouth once a day  -- Indication: For Blood thinner    famotidine 20 mg oral tablet  -- 1 tab(s) by mouth every 12 hours x 30 days   -- Indication: For Stomach protection

## 2017-09-27 NOTE — DISCHARGE NOTE ADULT - MEDICATION SUMMARY - MEDICATIONS TO STOP TAKING
I will STOP taking the medications listed below when I get home from the hospital:    Pradaxa 150 mg oral capsule  -- 1 cap(s) by mouth 2 times a day

## 2017-09-27 NOTE — DISCHARGE NOTE ADULT - CARE PLAN
Principal Discharge DX:	PFO (patent foramen ovale)  Goal:	to recover from surgery  Instructions for follow-up, activity and diet:	-Please follow up with Dr. Keating on 10/9/17 at 10:30am. The office is located at [facility], [facility], 4th floor. Call us with any questions  #[phone].    -Walk daily as tolerated and use your incentive spirometer every hour.    -No driving or strenuous activity/exercise for 6 weeks, or until  cleared by your surgeon.    -Gently clean your incisions with anti-bacterial soap and water, pat  dry.  You may leave them open to air.    -Call your doctor if you have shortness of breath, chest pain not  relieved by pain medication, dizziness, fever >101.5, or increased  redness or drainage from incisions. Principal Discharge DX:	PFO (patent foramen ovale)  Goal:	to recover from surgery  Instructions for follow-up, activity and diet:	-Please follow up with Dr. Keating on 10/9/17 at 10:30am. The office is located at [facility], [facility], 4th floor. Call us with any questions  #[phone].  -Walk daily and use incentive spirometer.   -Gently clean your incisions with anti-bacterial soap and water, pat  dry.  You may leave them open to air.    -Call your doctor if you have shortness of breath, chest pain not  relieved by pain medication, dizziness, fever >101.5, or increased  redness or drainage from incisions. Principal Discharge DX:	PFO (patent foramen ovale)  Goal:	to recover from surgery  Instructions for follow-up, activity and diet:	-Please follow up with Dr. Keating on 10/9/17 at 10:30am. The office is located at [facility], [facility], 4th floor. Call us with any questions  #[phone].  -Walk daily and use incentive spirometer.   -Gently clean your incisions with anti-bacterial soap and water, pat  dry.  You may leave them open to air.    -Call your doctor if you have shortness of breath, chest pain not  relieved by pain medication, dizziness, fever >101.5, or increased  redness or drainage from incisions.    -Please follow up with your primary care provider within 1-2 weeks of discharge.

## 2017-10-03 DIAGNOSIS — Q21.1 ATRIAL SEPTAL DEFECT: ICD-10-CM

## 2017-10-03 DIAGNOSIS — Z86.711 PERSONAL HISTORY OF PULMONARY EMBOLISM: ICD-10-CM

## 2017-10-03 DIAGNOSIS — I69.820 APHASIA FOLLOWING OTHER CEREBROVASCULAR DISEASE: ICD-10-CM

## 2017-10-03 DIAGNOSIS — Z79.01 LONG TERM (CURRENT) USE OF ANTICOAGULANTS: ICD-10-CM

## 2017-10-09 ENCOUNTER — APPOINTMENT (OUTPATIENT)
Dept: CARDIOTHORACIC SURGERY | Facility: CLINIC | Age: 57
End: 2017-10-09

## 2017-10-11 ENCOUNTER — APPOINTMENT (OUTPATIENT)
Dept: CARDIOTHORACIC SURGERY | Facility: CLINIC | Age: 57
End: 2017-10-11
Payer: COMMERCIAL

## 2017-10-11 PROCEDURE — 99213 OFFICE O/P EST LOW 20 MIN: CPT

## 2017-10-26 ENCOUNTER — OUTPATIENT (OUTPATIENT)
Dept: OUTPATIENT SERVICES | Facility: HOSPITAL | Age: 57
LOS: 1 days | Discharge: HOME | End: 2017-10-26

## 2017-10-26 DIAGNOSIS — I82.409 ACUTE EMBOLISM AND THROMBOSIS OF UNSPECIFIED DEEP VEINS OF UNSPECIFIED LOWER EXTREMITY: ICD-10-CM

## 2017-10-26 DIAGNOSIS — N40.1 BENIGN PROSTATIC HYPERPLASIA WITH LOWER URINARY TRACT SYMPTOMS: ICD-10-CM

## 2017-11-08 ENCOUNTER — APPOINTMENT (OUTPATIENT)
Dept: CARDIOLOGY | Facility: CLINIC | Age: 57
End: 2017-11-08

## 2017-11-15 ENCOUNTER — APPOINTMENT (OUTPATIENT)
Dept: CARDIOTHORACIC SURGERY | Facility: CLINIC | Age: 57
End: 2017-11-15
Payer: COMMERCIAL

## 2017-11-15 ENCOUNTER — APPOINTMENT (OUTPATIENT)
Dept: CARDIOLOGY | Facility: CLINIC | Age: 57
End: 2017-11-15

## 2017-11-15 PROCEDURE — 99213 OFFICE O/P EST LOW 20 MIN: CPT

## 2017-12-12 ENCOUNTER — APPOINTMENT (OUTPATIENT)
Dept: HEMATOLOGY ONCOLOGY | Facility: CLINIC | Age: 57
End: 2017-12-12
Payer: COMMERCIAL

## 2017-12-12 ENCOUNTER — OUTPATIENT (OUTPATIENT)
Dept: OUTPATIENT SERVICES | Facility: HOSPITAL | Age: 57
LOS: 1 days | Discharge: HOME | End: 2017-12-12

## 2017-12-12 VITALS
OXYGEN SATURATION: 98 % | BODY MASS INDEX: 26.37 KG/M2 | HEIGHT: 67 IN | WEIGHT: 167.99 LBS | HEART RATE: 63 BPM | TEMPERATURE: 97.2 F | RESPIRATION RATE: 14 BRPM | DIASTOLIC BLOOD PRESSURE: 73 MMHG | SYSTOLIC BLOOD PRESSURE: 120 MMHG

## 2017-12-12 PROCEDURE — 99215 OFFICE O/P EST HI 40 MIN: CPT

## 2017-12-12 RX ORDER — ENOXAPARIN SODIUM 100 MG/ML
100 INJECTION SUBCUTANEOUS DAILY
Qty: 2 | Refills: 0 | Status: DISCONTINUED | COMMUNITY
Start: 2017-09-21 | End: 2017-12-12

## 2017-12-12 RX ORDER — ASPIRIN 81 MG
81 TABLET, DELAYED RELEASE (ENTERIC COATED) ORAL
Refills: 0 | Status: ACTIVE | COMMUNITY

## 2017-12-13 ENCOUNTER — APPOINTMENT (OUTPATIENT)
Dept: CARDIOLOGY | Facility: CLINIC | Age: 57
End: 2017-12-13

## 2017-12-13 VITALS — HEART RATE: 73 BPM | SYSTOLIC BLOOD PRESSURE: 118 MMHG | OXYGEN SATURATION: 97 % | DIASTOLIC BLOOD PRESSURE: 82 MMHG

## 2017-12-13 VITALS — BODY MASS INDEX: 26.53 KG/M2 | WEIGHT: 169 LBS | HEIGHT: 67 IN

## 2017-12-13 DIAGNOSIS — I63.9 CEREBRAL INFARCTION, UNSPECIFIED: ICD-10-CM

## 2017-12-13 RX ORDER — FINASTERIDE 5 MG/1
5 TABLET, FILM COATED ORAL DAILY
Refills: 0 | Status: ACTIVE | COMMUNITY

## 2017-12-14 ENCOUNTER — OUTPATIENT (OUTPATIENT)
Dept: OUTPATIENT SERVICES | Facility: HOSPITAL | Age: 57
LOS: 1 days | Discharge: HOME | End: 2017-12-14

## 2017-12-14 DIAGNOSIS — Z86.73 PERSONAL HISTORY OF TRANSIENT ISCHEMIC ATTACK (TIA), AND CEREBRAL INFARCTION WITHOUT RESIDUAL DEFICITS: ICD-10-CM

## 2017-12-14 DIAGNOSIS — I82.409 ACUTE EMBOLISM AND THROMBOSIS OF UNSPECIFIED DEEP VEINS OF UNSPECIFIED LOWER EXTREMITY: ICD-10-CM

## 2017-12-14 DIAGNOSIS — E78.5 HYPERLIPIDEMIA, UNSPECIFIED: ICD-10-CM

## 2018-01-30 DIAGNOSIS — I82.409 ACUTE EMBOLISM AND THROMBOSIS OF UNSPECIFIED DEEP VEINS OF UNSPECIFIED LOWER EXTREMITY: ICD-10-CM

## 2018-04-04 ENCOUNTER — APPOINTMENT (OUTPATIENT)
Dept: CARDIOLOGY | Facility: CLINIC | Age: 58
End: 2018-04-04

## 2018-04-06 ENCOUNTER — APPOINTMENT (OUTPATIENT)
Dept: CARDIOTHORACIC SURGERY | Facility: CLINIC | Age: 58
End: 2018-04-06
Payer: COMMERCIAL

## 2018-04-06 PROCEDURE — 99214 OFFICE O/P EST MOD 30 MIN: CPT

## 2018-05-14 ENCOUNTER — APPOINTMENT (OUTPATIENT)
Dept: CARDIOLOGY | Facility: CLINIC | Age: 58
End: 2018-05-14

## 2018-05-22 NOTE — PATIENT PROFILE ADULT. - NS PRO MODE OF ARRIVAL
Detail Level: Detailed Quality 431: Preventive Care And Screening: Unhealthy Alcohol Use - Screening: Patient screened for unhealthy alcohol use using a single question and scores less than 2 times per year stretcher

## 2018-10-04 PROBLEM — Q21.1 ATRIAL SEPTAL DEFECT: Chronic | Status: ACTIVE | Noted: 2017-09-26

## 2018-10-04 PROBLEM — I63.9 CEREBRAL INFARCTION, UNSPECIFIED: Chronic | Status: ACTIVE | Noted: 2017-09-26

## 2018-10-04 PROBLEM — I26.99 OTHER PULMONARY EMBOLISM WITHOUT ACUTE COR PULMONALE: Chronic | Status: ACTIVE | Noted: 2017-09-26

## 2018-10-12 ENCOUNTER — APPOINTMENT (OUTPATIENT)
Dept: CARDIOTHORACIC SURGERY | Facility: CLINIC | Age: 58
End: 2018-10-12
Payer: COMMERCIAL

## 2018-10-12 PROCEDURE — 99213 OFFICE O/P EST LOW 20 MIN: CPT

## 2018-10-29 ENCOUNTER — OUTPATIENT (OUTPATIENT)
Dept: OUTPATIENT SERVICES | Facility: HOSPITAL | Age: 58
LOS: 1 days | Discharge: HOME | End: 2018-10-29

## 2018-10-29 DIAGNOSIS — Z00.00 ENCOUNTER FOR GENERAL ADULT MEDICAL EXAMINATION WITHOUT ABNORMAL FINDINGS: ICD-10-CM

## 2018-11-19 ENCOUNTER — FORM ENCOUNTER (OUTPATIENT)
Age: 58
End: 2018-11-19

## 2018-11-20 ENCOUNTER — OUTPATIENT (OUTPATIENT)
Dept: OUTPATIENT SERVICES | Facility: HOSPITAL | Age: 58
LOS: 1 days | Discharge: HOME | End: 2018-11-20

## 2018-11-20 DIAGNOSIS — Q21.1 ATRIAL SEPTAL DEFECT: ICD-10-CM

## 2018-12-11 ENCOUNTER — RX RENEWAL (OUTPATIENT)
Age: 58
End: 2018-12-11

## 2019-01-25 ENCOUNTER — OUTPATIENT (OUTPATIENT)
Dept: OUTPATIENT SERVICES | Facility: HOSPITAL | Age: 59
LOS: 1 days | Discharge: HOME | End: 2019-01-25

## 2019-01-25 DIAGNOSIS — I63.9 CEREBRAL INFARCTION, UNSPECIFIED: ICD-10-CM

## 2019-01-25 DIAGNOSIS — R47.01 APHASIA: ICD-10-CM

## 2019-05-13 ENCOUNTER — TRANSCRIPTION ENCOUNTER (OUTPATIENT)
Age: 59
End: 2019-05-13

## 2019-05-31 ENCOUNTER — OUTPATIENT (OUTPATIENT)
Dept: OUTPATIENT SERVICES | Facility: HOSPITAL | Age: 59
LOS: 1 days | Discharge: HOME | End: 2019-05-31

## 2019-05-31 DIAGNOSIS — E78.2 MIXED HYPERLIPIDEMIA: ICD-10-CM

## 2019-05-31 DIAGNOSIS — M79.10 MYALGIA, UNSPECIFIED SITE: ICD-10-CM

## 2019-09-26 ENCOUNTER — OTHER (OUTPATIENT)
Age: 59
End: 2019-09-26

## 2019-10-02 ENCOUNTER — OUTPATIENT (OUTPATIENT)
Dept: OUTPATIENT SERVICES | Facility: HOSPITAL | Age: 59
LOS: 1 days | Discharge: HOME | End: 2019-10-02

## 2019-10-02 ENCOUNTER — FORM ENCOUNTER (OUTPATIENT)
Age: 59
End: 2019-10-02

## 2019-10-02 DIAGNOSIS — Z87.74 PERSONAL HISTORY OF (CORRECTED) CONGENITAL MALFORMATIONS OF HEART AND CIRCULATORY SYSTEM: ICD-10-CM

## 2019-10-03 ENCOUNTER — OUTPATIENT (OUTPATIENT)
Dept: OUTPATIENT SERVICES | Facility: HOSPITAL | Age: 59
LOS: 1 days | Discharge: HOME | End: 2019-10-03
Payer: COMMERCIAL

## 2019-10-03 DIAGNOSIS — Z87.74 PERSONAL HISTORY OF (CORRECTED) CONGENITAL MALFORMATIONS OF HEART AND CIRCULATORY SYSTEM: ICD-10-CM

## 2019-10-03 PROCEDURE — 93306 TTE W/DOPPLER COMPLETE: CPT | Mod: 26

## 2019-10-09 LAB
ANION GAP SERPL CALC-SCNC: 11 MMOL/L
BASOPHILS # BLD AUTO: 0.04 K/UL
BASOPHILS NFR BLD AUTO: 0.5 %
BUN SERPL-MCNC: 16 MG/DL
CALCIUM SERPL-MCNC: 9.6 MG/DL
CHLORIDE SERPL-SCNC: 103 MMOL/L
CO2 SERPL-SCNC: 27 MMOL/L
CREAT SERPL-MCNC: 0.9 MG/DL
EOSINOPHIL # BLD AUTO: 0.09 K/UL
EOSINOPHIL NFR BLD AUTO: 1 %
GLUCOSE SERPL-MCNC: 96 MG/DL
HCT VFR BLD CALC: 44.2 %
HGB BLD-MCNC: 15.7 G/DL
IMM GRANULOCYTES NFR BLD AUTO: 0.2 %
LYMPHOCYTES # BLD AUTO: 1.89 K/UL
LYMPHOCYTES NFR BLD AUTO: 21.3 %
MAN DIFF?: NORMAL
MCHC RBC-ENTMCNC: 31.5 PG
MCHC RBC-ENTMCNC: 35.5 G/DL
MCV RBC AUTO: 88.6 FL
MONOCYTES # BLD AUTO: 0.67 K/UL
MONOCYTES NFR BLD AUTO: 7.6 %
NEUTROPHILS # BLD AUTO: 6.16 K/UL
NEUTROPHILS NFR BLD AUTO: 69.4 %
PLATELET # BLD AUTO: 208 K/UL
POTASSIUM SERPL-SCNC: 4.7 MMOL/L
RBC # BLD: 4.99 M/UL
RBC # FLD: 12.3 %
SODIUM SERPL-SCNC: 141 MMOL/L
WBC # FLD AUTO: 8.87 K/UL

## 2019-10-16 ENCOUNTER — APPOINTMENT (OUTPATIENT)
Dept: CARDIOTHORACIC SURGERY | Facility: CLINIC | Age: 59
End: 2019-10-16
Payer: COMMERCIAL

## 2019-10-16 VITALS
RESPIRATION RATE: 12 BRPM | WEIGHT: 169 LBS | BODY MASS INDEX: 26.53 KG/M2 | HEART RATE: 72 BPM | OXYGEN SATURATION: 96 % | TEMPERATURE: 97.9 F | HEIGHT: 67 IN

## 2019-10-16 DIAGNOSIS — Z78.9 OTHER SPECIFIED HEALTH STATUS: ICD-10-CM

## 2019-10-16 PROCEDURE — 99213 OFFICE O/P EST LOW 20 MIN: CPT

## 2019-10-16 RX ORDER — MENTHOL/CAMPHOR 0.5 %-0.5%
1000 LOTION (ML) TOPICAL
Refills: 0 | Status: ACTIVE | COMMUNITY

## 2019-10-16 RX ORDER — CLOPIDOGREL 75 MG/1
75 TABLET, FILM COATED ORAL
Refills: 0 | Status: DISCONTINUED | COMMUNITY
End: 2019-10-16

## 2019-10-16 RX ORDER — TIMOLO/BRIMON/DORZO/LATANOP/PF 0.5-.15-2%
0.5-0.15-2 DROPS OPHTHALMIC (EYE)
Refills: 0 | Status: ACTIVE | COMMUNITY

## 2019-10-16 RX ORDER — AMOXICILLIN 500 MG/1
500 TABLET, FILM COATED ORAL
Qty: 4 | Refills: 0 | Status: DISCONTINUED | COMMUNITY
Start: 2018-12-11 | End: 2019-10-16

## 2019-10-16 NOTE — HISTORY OF PRESENT ILLNESS
[FreeTextEntry1] : 59 y. M, with prior hx of CVA,  b/l pulmonary emboli ,s/p  PFO closure with Dr. Keating on 9/26/2017 presents for his 2 year f/u.  Pt had echo (10/3/2019) indicating EF 60 -65 %, mild mitral valve regurgitation, Intra-atrial septum with closure device in place, without shunt.\par \par Pt reports doing well.  However, is seeing Dr. 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  He had positive antibodies on blood work.  Pt continues to work full time, and goes to the gym 3-4 X week.  Pt continues to have difficultly processing information since his stoke, it has not progressed  but been at the same baseline post his stroke.  He does f/u with his Neurologist.   Pt denies CP, SOB, palpitations, dizziness, weakness and fevers\par \par PCP MARIA EUGENIA

## 2019-10-16 NOTE — ASSESSMENT
[FreeTextEntry1] : 59 y. M, with prior hx of CVA,  b/l pulmonary emboli ,s/p  PFO closure with Dr. Keating on 9/26/2017 presents for his 2 year f/u.  Pt had echo (10/3/2019) indicating EF 60 -65 %, mild mitral valve regurgitation, Intra-atrial septum with closure device in place, without shunt.\par \par Mr. Zhao is doing well, no new complaints, clinically stable, NYHA I.  Dr Keating reviewed echo images. Labs  reviewed.  All questions answered.  \par \par RTO in 1 year\par TTE, EKG, CBC & BMP prior to visit.\par Continue ASA 81 mg\par F/U with Cardio / Medical team as per routine\par Call office for any questions and/or concerns\par  Detail Level: Zone Detail Level: Generalized Detail Level: Simple Detail Level: Detailed

## 2019-10-16 NOTE — PHYSICAL EXAM
[Sclera] : the sclera and conjunctiva were normal [Strabismus] : no strabismus was seen [Neck Appearance] : the appearance of the neck was normal [Neck Cervical Mass (___cm)] : no neck mass was observed [Jugular Venous Distention Increased] : there was no jugular-venous distention [] : no respiratory distress [Respiration, Rhythm And Depth] : normal respiratory rhythm and effort [Exaggerated Use Of Accessory Muscles For Inspiration] : no accessory muscle use [Auscultation Breath Sounds / Voice Sounds] : lungs were clear to auscultation bilaterally [Apical Impulse] : the apical impulse was normal [Heart Rate And Rhythm] : heart rate was normal and rhythm regular [Heart Sounds] : normal S1 and S2 [Heart Sounds Gallop] : no gallops [Murmurs] : no murmurs [Heart Sounds Pericardial Friction Rub] : no pericardial rub [Examination Of The Chest] : the chest was normal in appearance [Abdomen Soft] : soft [Abnormal Walk] : normal gait [Nail Clubbing] : no clubbing  or cyanosis of the fingernails [Involuntary Movements] : no involuntary movements were seen [Skin Color & Pigmentation] : normal skin color and pigmentation

## 2019-10-16 NOTE — REVIEW OF SYSTEMS
[Arthralgias] : arthralgias [Joint Pain] : joint pain [Negative] : Psychiatric [Fever] : no fever [Chills] : no chills [Feeling Poorly] : not feeling poorly [Feeling Tired] : not feeling tired [Chest Pain] : no chest pain [Palpitations] : no palpitations [Lower Ext Edema] : no extremity edema [Shortness Of Breath] : no shortness of breath [Wheezing] : no wheezing [Cough] : no cough [SOB on Exertion] : no shortness of breath during exertion

## 2019-11-06 ENCOUNTER — OUTPATIENT (OUTPATIENT)
Dept: OUTPATIENT SERVICES | Facility: HOSPITAL | Age: 59
LOS: 1 days | Discharge: HOME | End: 2019-11-06

## 2019-11-06 DIAGNOSIS — N40.1 BENIGN PROSTATIC HYPERPLASIA WITH LOWER URINARY TRACT SYMPTOMS: ICD-10-CM

## 2019-11-26 ENCOUNTER — APPOINTMENT (OUTPATIENT)
Dept: GASTROENTEROLOGY | Facility: CLINIC | Age: 59
End: 2019-11-26
Payer: COMMERCIAL

## 2019-11-26 VITALS
HEIGHT: 67 IN | SYSTOLIC BLOOD PRESSURE: 128 MMHG | WEIGHT: 170 LBS | DIASTOLIC BLOOD PRESSURE: 80 MMHG | HEART RATE: 92 BPM | BODY MASS INDEX: 26.68 KG/M2

## 2019-11-26 DIAGNOSIS — N40.0 BENIGN PROSTATIC HYPERPLASIA WITHOUT LOWER URINARY TRACT SYMPMS: ICD-10-CM

## 2019-11-26 DIAGNOSIS — Z87.09 PERSONAL HISTORY OF OTHER DISEASES OF THE RESPIRATORY SYSTEM: ICD-10-CM

## 2019-11-26 PROCEDURE — 99203 OFFICE O/P NEW LOW 30 MIN: CPT

## 2019-11-26 RX ORDER — LATANOPROST/PF 0.005 %
0.01 DROPS OPHTHALMIC (EYE)
Refills: 0 | Status: ACTIVE | COMMUNITY

## 2019-11-26 NOTE — PHYSICAL EXAM
[General Appearance - Alert] : alert [General Appearance - In No Acute Distress] : in no acute distress [PERRL With Normal Accommodation] : pupils were equal in size, round, and reactive to light [Sclera] : the sclera and conjunctiva were normal [Extraocular Movements] : extraocular movements were intact [Outer Ear] : the ears and nose were normal in appearance [Neck Appearance] : the appearance of the neck was normal [Oropharynx] : the oropharynx was normal [Jugular Venous Distention Increased] : there was no jugular-venous distention [Neck Cervical Mass (___cm)] : no neck mass was observed [Auscultation Breath Sounds / Voice Sounds] : lungs were clear to auscultation bilaterally [Abdomen Tenderness] : non-tender [Bowel Sounds] : normal bowel sounds [Abdomen Soft] : soft [Skin Color & Pigmentation] : normal skin color and pigmentation [Skin Turgor] : normal skin turgor [Oriented To Time, Place, And Person] : oriented to person, place, and time [Impaired Insight] : insight and judgment were intact [] : no rash [Mood] : the mood was normal [Affect] : the affect was normal

## 2019-11-26 NOTE — HISTORY OF PRESENT ILLNESS
[Heartburn] : denies heartburn [Nausea] : denies nausea [Vomiting] : denies vomiting [Diarrhea] : denies diarrhea [Constipation] : denies constipation [Yellow Skin Or Eyes (Jaundice)] : denies jaundice [Abdominal Pain] : denies abdominal pain [de-identified] : This is a 59 year old male  who presents for evaluation for screening colonoscopy. The patient denies nausea, vomiting , dysphagia, odynophagia, post prandial abdominal pain, or melena. The patient denies abdominal cramping, or black or bloody stool.

## 2020-01-10 NOTE — ASU PATIENT PROFILE, ADULT - PMH
BPH with elevated PSA    Cerebrovascular accident (CVA) due to embolism    PE (pulmonary thromboembolism)    PFO (patent foramen ovale)

## 2020-01-13 ENCOUNTER — TRANSCRIPTION ENCOUNTER (OUTPATIENT)
Age: 60
End: 2020-01-13

## 2020-01-13 ENCOUNTER — OUTPATIENT (OUTPATIENT)
Dept: OUTPATIENT SERVICES | Facility: HOSPITAL | Age: 60
LOS: 1 days | Discharge: HOME | End: 2020-01-13
Payer: COMMERCIAL

## 2020-01-13 VITALS — SYSTOLIC BLOOD PRESSURE: 118 MMHG | HEART RATE: 68 BPM | RESPIRATION RATE: 17 BRPM | DIASTOLIC BLOOD PRESSURE: 78 MMHG

## 2020-01-13 VITALS
RESPIRATION RATE: 18 BRPM | WEIGHT: 164.91 LBS | OXYGEN SATURATION: 98 % | HEART RATE: 59 BPM | HEIGHT: 67 IN | SYSTOLIC BLOOD PRESSURE: 139 MMHG | TEMPERATURE: 96 F | DIASTOLIC BLOOD PRESSURE: 79 MMHG

## 2020-01-13 DIAGNOSIS — Z98.890 OTHER SPECIFIED POSTPROCEDURAL STATES: Chronic | ICD-10-CM

## 2020-01-13 PROCEDURE — G0121 COLON CA SCRN NOT HI RSK IND: CPT

## 2020-01-13 RX ORDER — ERGOCALCIFEROL 1.25 MG/1
1 CAPSULE ORAL
Qty: 0 | Refills: 0 | DISCHARGE

## 2020-01-13 RX ORDER — FINASTERIDE 5 MG/1
5 TABLET, FILM COATED ORAL
Qty: 0 | Refills: 0 | DISCHARGE

## 2020-01-13 RX ORDER — BECLOMETHASONE DIPROPIONATE 40 UG/1
1 AEROSOL, METERED RESPIRATORY (INHALATION)
Qty: 0 | Refills: 0 | DISCHARGE

## 2020-01-13 RX ORDER — ALBUTEROL 90 UG/1
2 AEROSOL, METERED ORAL
Qty: 0 | Refills: 0 | DISCHARGE

## 2020-01-13 RX ORDER — OMEGA-3 ACID ETHYL ESTERS 1 G
0 CAPSULE ORAL
Qty: 0 | Refills: 0 | DISCHARGE

## 2020-01-13 NOTE — ASU DISCHARGE PLAN (ADULT/PEDIATRIC) - CARE PROVIDER_API CALL
Dawit Durán)  Gastroenterology; Internal Medicine  [address] [place] [address]  Phone: [phone]  Fax: [phone]  Follow Up Time: 1 month

## 2020-01-13 NOTE — ASU PREOP CHECKLIST - RESPIRATORY RATE (BREATHS/MIN)
Telephone Encounter by Liz Kessler MD at 07/24/17 01:02 PM     Author:  Liz Kessler MD Service:  (none) Author Type:  Physician     Filed:  07/24/17 01:02 PM Encounter Date:  7/24/2017 Status:  Signed     :  Liz Kessler MD (Physician)            Called the patient, but received voicemail.  Left message.  Will attempt to try to call again later.[AB1.1T]          Revision History        User Key Date/Time User Provider Type Action    > AB1.1 07/24/17 01:02 PM Liz Kessler MD Physician Sign    T - Template             18

## 2020-01-15 DIAGNOSIS — Z12.11 ENCOUNTER FOR SCREENING FOR MALIGNANT NEOPLASM OF COLON: ICD-10-CM

## 2020-01-15 DIAGNOSIS — K64.8 OTHER HEMORRHOIDS: ICD-10-CM

## 2020-01-23 PROBLEM — N40.0 BENIGN PROSTATIC HYPERPLASIA WITHOUT LOWER URINARY TRACT SYMPTOMS: Chronic | Status: ACTIVE | Noted: 2020-01-13

## 2020-03-10 ENCOUNTER — APPOINTMENT (OUTPATIENT)
Dept: GASTROENTEROLOGY | Facility: CLINIC | Age: 60
End: 2020-03-10
Payer: COMMERCIAL

## 2020-03-10 VITALS
HEART RATE: 64 BPM | BODY MASS INDEX: 26.37 KG/M2 | HEIGHT: 67 IN | SYSTOLIC BLOOD PRESSURE: 122 MMHG | WEIGHT: 168 LBS | DIASTOLIC BLOOD PRESSURE: 80 MMHG

## 2020-03-10 DIAGNOSIS — Z12.11 ENCOUNTER FOR SCREENING FOR MALIGNANT NEOPLASM OF COLON: ICD-10-CM

## 2020-03-10 PROCEDURE — 99213 OFFICE O/P EST LOW 20 MIN: CPT

## 2020-03-10 NOTE — HISTORY OF PRESENT ILLNESS
[de-identified] : This is a 59 year old male who presents for followup after colonoscopy. Screening colonoscopy revealed only internal hemorrhoids. The patient denies nausea, vomiting , dysphagia, odynophagia, post prandial abdominal pain, or melena. The patient denies abdominal cramping, or black or bloody stool.

## 2020-03-10 NOTE — PHYSICAL EXAM
[General Appearance - Alert] : alert [General Appearance - In No Acute Distress] : in no acute distress [Sclera] : the sclera and conjunctiva were normal [PERRL With Normal Accommodation] : pupils were equal in size, round, and reactive to light [Extraocular Movements] : extraocular movements were intact [Outer Ear] : the ears and nose were normal in appearance [Oropharynx] : the oropharynx was normal [Neck Appearance] : the appearance of the neck was normal [Neck Cervical Mass (___cm)] : no neck mass was observed [Jugular Venous Distention Increased] : there was no jugular-venous distention [Auscultation Breath Sounds / Voice Sounds] : lungs were clear to auscultation bilaterally [Apical Impulse] : the apical impulse was normal [Bowel Sounds] : normal bowel sounds [Abdomen Soft] : soft [Abdomen Tenderness] : non-tender [Skin Color & Pigmentation] : normal skin color and pigmentation [Skin Turgor] : normal skin turgor [] : no rash [Oriented To Time, Place, And Person] : oriented to person, place, and time [Impaired Insight] : insight and judgment were intact [Affect] : the affect was normal [Mood] : the mood was normal

## 2020-04-25 ENCOUNTER — MESSAGE (OUTPATIENT)
Age: 60
End: 2020-04-25

## 2020-05-05 ENCOUNTER — APPOINTMENT (OUTPATIENT)
Dept: DISASTER EMERGENCY | Facility: CLINIC | Age: 60
End: 2020-05-05

## 2020-05-06 LAB
SARS-COV-2 IGG SERPL IA-ACNC: <0.1 INDEX
SARS-COV-2 IGG SERPL QL IA: NEGATIVE

## 2020-10-14 ENCOUNTER — APPOINTMENT (OUTPATIENT)
Dept: CARDIOTHORACIC SURGERY | Facility: CLINIC | Age: 60
End: 2020-10-14

## 2020-10-19 ENCOUNTER — OUTPATIENT (OUTPATIENT)
Dept: OUTPATIENT SERVICES | Facility: HOSPITAL | Age: 60
LOS: 1 days | End: 2020-10-19
Payer: COMMERCIAL

## 2020-10-19 ENCOUNTER — APPOINTMENT (OUTPATIENT)
Dept: CARDIOTHORACIC SURGERY | Facility: CLINIC | Age: 60
End: 2020-10-19
Payer: COMMERCIAL

## 2020-10-19 ENCOUNTER — FORM ENCOUNTER (OUTPATIENT)
Age: 60
End: 2020-10-19

## 2020-10-19 VITALS
HEART RATE: 54 BPM | SYSTOLIC BLOOD PRESSURE: 126 MMHG | DIASTOLIC BLOOD PRESSURE: 76 MMHG | BODY MASS INDEX: 23.7 KG/M2 | RESPIRATION RATE: 17 BRPM | OXYGEN SATURATION: 98 % | WEIGHT: 151 LBS | TEMPERATURE: 96.9 F | HEIGHT: 67 IN

## 2020-10-19 DIAGNOSIS — I63.9 CEREBRAL INFARCTION, UNSPECIFIED: ICD-10-CM

## 2020-10-19 DIAGNOSIS — Z98.890 OTHER SPECIFIED POSTPROCEDURAL STATES: Chronic | ICD-10-CM

## 2020-10-19 DIAGNOSIS — Q21.1 ATRIAL SEPTAL DEFECT: ICD-10-CM

## 2020-10-19 PROCEDURE — 99072 ADDL SUPL MATRL&STAF TM PHE: CPT

## 2020-10-19 PROCEDURE — 99214 OFFICE O/P EST MOD 30 MIN: CPT

## 2020-10-19 PROCEDURE — 93306 TTE W/DOPPLER COMPLETE: CPT

## 2020-10-19 PROCEDURE — 93306 TTE W/DOPPLER COMPLETE: CPT | Mod: 26

## 2020-10-23 NOTE — HISTORY OF PRESENT ILLNESS
[FreeTextEntry1] : 60 year old male a history of hypertension, history of PE, history of CVA, patent foramen ovale s/p PFO closure on 09/26/2017 who presents for follow up. \par \par Since his last visit, the patient states he is feeling very well.  He has been exercising with higher tolerance.  He denies chest pain, shortness of breath at rest or with exertion, palpitations, dizziness, syncope, orthopnea, PND, or LE edema.  He also denies any neurological symptoms.

## 2020-10-27 ENCOUNTER — OUTPATIENT (OUTPATIENT)
Dept: OUTPATIENT SERVICES | Facility: HOSPITAL | Age: 60
LOS: 1 days | Discharge: HOME | End: 2020-10-27

## 2020-10-27 ENCOUNTER — LABORATORY RESULT (OUTPATIENT)
Age: 60
End: 2020-10-27

## 2020-10-27 DIAGNOSIS — Z98.890 OTHER SPECIFIED POSTPROCEDURAL STATES: Chronic | ICD-10-CM

## 2020-10-27 DIAGNOSIS — Z11.59 ENCOUNTER FOR SCREENING FOR OTHER VIRAL DISEASES: ICD-10-CM

## 2020-10-29 ENCOUNTER — FORM ENCOUNTER (OUTPATIENT)
Age: 60
End: 2020-10-29

## 2020-10-30 ENCOUNTER — APPOINTMENT (OUTPATIENT)
Dept: CARDIOTHORACIC SURGERY | Facility: CLINIC | Age: 60
End: 2020-10-30

## 2020-10-30 ENCOUNTER — OUTPATIENT (OUTPATIENT)
Dept: OUTPATIENT SERVICES | Facility: HOSPITAL | Age: 60
LOS: 1 days | End: 2020-10-30
Payer: COMMERCIAL

## 2020-10-30 DIAGNOSIS — Q21.1 ATRIAL SEPTAL DEFECT: ICD-10-CM

## 2020-10-30 DIAGNOSIS — Z98.890 OTHER SPECIFIED POSTPROCEDURAL STATES: Chronic | ICD-10-CM

## 2020-10-30 PROCEDURE — 93320 DOPPLER ECHO COMPLETE: CPT | Mod: 26

## 2020-10-30 PROCEDURE — 93312 ECHO TRANSESOPHAGEAL: CPT | Mod: 26

## 2020-10-30 PROCEDURE — 93312 ECHO TRANSESOPHAGEAL: CPT

## 2020-10-30 PROCEDURE — 76376 3D RENDER W/INTRP POSTPROCES: CPT | Mod: 26

## 2020-11-02 ENCOUNTER — APPOINTMENT (OUTPATIENT)
Dept: CARDIOTHORACIC SURGERY | Facility: CLINIC | Age: 60
End: 2020-11-02
Payer: COMMERCIAL

## 2020-11-02 PROCEDURE — 99448 NTRPROF PH1/NTRNET/EHR 21-30: CPT

## 2020-11-03 NOTE — HISTORY OF PRESENT ILLNESS
[FreeTextEntry1] : 60 year old male a history of hypertension, history of PE, history of CVA, patent foramen ovale s/p PFO closure on 09/26/2017 who presents for follow up after testing. \par \par Since his last visit, the patient

## 2020-11-13 ENCOUNTER — APPOINTMENT (OUTPATIENT)
Dept: CT IMAGING | Facility: HOSPITAL | Age: 60
End: 2020-11-13
Payer: COMMERCIAL

## 2020-11-13 ENCOUNTER — RESULT REVIEW (OUTPATIENT)
Age: 60
End: 2020-11-13

## 2020-11-13 ENCOUNTER — OUTPATIENT (OUTPATIENT)
Dept: OUTPATIENT SERVICES | Facility: HOSPITAL | Age: 60
LOS: 1 days | End: 2020-11-13
Payer: COMMERCIAL

## 2020-11-13 DIAGNOSIS — Z98.890 OTHER SPECIFIED POSTPROCEDURAL STATES: Chronic | ICD-10-CM

## 2020-11-13 PROCEDURE — 70450 CT HEAD/BRAIN W/O DYE: CPT

## 2020-11-13 PROCEDURE — 70450 CT HEAD/BRAIN W/O DYE: CPT | Mod: 26

## 2020-11-20 ENCOUNTER — APPOINTMENT (OUTPATIENT)
Dept: NEUROLOGY | Facility: CLINIC | Age: 60
End: 2020-11-20
Payer: COMMERCIAL

## 2020-11-20 VITALS
WEIGHT: 150 LBS | HEIGHT: 67 IN | SYSTOLIC BLOOD PRESSURE: 111 MMHG | TEMPERATURE: 96.3 F | HEART RATE: 68 BPM | DIASTOLIC BLOOD PRESSURE: 72 MMHG | BODY MASS INDEX: 23.54 KG/M2 | OXYGEN SATURATION: 96 %

## 2020-11-20 DIAGNOSIS — Z87.74 PERSONAL HISTORY OF (CORRECTED) CONGENITAL MALFORMATIONS OF HEART AND CIRCULATORY SYSTEM: ICD-10-CM

## 2020-11-20 PROCEDURE — 99205 OFFICE O/P NEW HI 60 MIN: CPT

## 2020-11-25 ENCOUNTER — OUTPATIENT (OUTPATIENT)
Dept: OUTPATIENT SERVICES | Facility: HOSPITAL | Age: 60
LOS: 1 days | Discharge: HOME | End: 2020-11-25
Payer: COMMERCIAL

## 2020-11-25 DIAGNOSIS — G43.109 MIGRAINE WITH AURA, NOT INTRACTABLE, WITHOUT STATUS MIGRAINOSUS: ICD-10-CM

## 2020-11-25 DIAGNOSIS — Z98.890 OTHER SPECIFIED POSTPROCEDURAL STATES: Chronic | ICD-10-CM

## 2020-11-25 DIAGNOSIS — I63.9 CEREBRAL INFARCTION, UNSPECIFIED: ICD-10-CM

## 2020-11-25 DIAGNOSIS — Z87.74 PERSONAL HISTORY OF (CORRECTED) CONGENITAL MALFORMATIONS OF HEART AND CIRCULATORY SYSTEM: ICD-10-CM

## 2020-11-25 PROCEDURE — 70551 MRI BRAIN STEM W/O DYE: CPT | Mod: 26

## 2020-12-02 ENCOUNTER — APPOINTMENT (OUTPATIENT)
Dept: NEUROLOGY | Facility: CLINIC | Age: 60
End: 2020-12-02
Payer: COMMERCIAL

## 2020-12-02 PROCEDURE — 99213 OFFICE O/P EST LOW 20 MIN: CPT | Mod: 95

## 2020-12-14 ENCOUNTER — APPOINTMENT (OUTPATIENT)
Dept: CARDIOTHORACIC SURGERY | Facility: CLINIC | Age: 60
End: 2020-12-14
Payer: COMMERCIAL

## 2020-12-14 PROCEDURE — 99214 OFFICE O/P EST MOD 30 MIN: CPT | Mod: 95

## 2020-12-23 PROBLEM — Z12.11 ENCOUNTER FOR SCREENING COLONOSCOPY: Status: RESOLVED | Noted: 2019-11-26 | Resolved: 2020-12-23

## 2021-01-14 NOTE — ANESTHESIA FOLLOW-UP NOTE - NSCONDITION_GEN_ALL_CORE
Stable
Patient nontoxic appearing, stable vitals, ambulatory with stable saturation without supplemental oxygen. Patient advised about self-quarantine instructions until negative test results and/or symptom resolution. Patient advised on hand hygiene, monitoring of symptoms, antipyretic use as well as follow up with primary care provider. Advised verbally and on pre-printed instructions to return immediately for symptoms including but not limited to severe chest pain, shortness of breath, fever not reduced with OTC antipyretic use, inability to tolerate food or liquid. Instructions provided in pre-printed copy.

## 2021-03-24 ENCOUNTER — APPOINTMENT (OUTPATIENT)
Dept: ORTHOPEDIC SURGERY | Facility: CLINIC | Age: 61
End: 2021-03-24
Payer: COMMERCIAL

## 2021-03-24 VITALS
HEIGHT: 67 IN | DIASTOLIC BLOOD PRESSURE: 77 MMHG | WEIGHT: 149 LBS | SYSTOLIC BLOOD PRESSURE: 119 MMHG | BODY MASS INDEX: 23.39 KG/M2 | OXYGEN SATURATION: 98 % | HEART RATE: 78 BPM

## 2021-03-24 DIAGNOSIS — Z82.61 FAMILY HISTORY OF ARTHRITIS: ICD-10-CM

## 2021-03-24 PROCEDURE — 99072 ADDL SUPL MATRL&STAF TM PHE: CPT

## 2021-03-24 PROCEDURE — 99204 OFFICE O/P NEW MOD 45 MIN: CPT

## 2021-03-24 PROCEDURE — 73030 X-RAY EXAM OF SHOULDER: CPT | Mod: LT

## 2021-03-24 NOTE — ASSESSMENT
[FreeTextEntry1] : 60-year-old right-hand-dominant gentleman with prior history of left proximal humerus greater tuberosity fracture and humeral shaft fracture in 1985 with limited baseline range of motion who fell about 2 months ago on the back of his left elbow and has shoulder pain.  I suspect that he may have some rotator cuff tendinopathy and may have strained the rotator cuff tendons.  Even though there is limited motion it seems like this is baseline.  He is having some pain and mild weakness.\par I recommended physical therapy.  Heat and ice as needed.  For a week or 2 he can take Aleve 2 tablets once or twice a day with meals as an anti-inflammatory.  If it is not better in the next 6 weeks he should get an MRI and follow-up.\par He has some painless clicking in the left knee.  Functionally his knee is good and exam is unremarkable today.  No treatment was recommended.  If he develops pain in the knee or any swelling or functional issues he should let me know and we will work it up further.\par Follow-up in 6 weeks unless he is better.

## 2021-03-24 NOTE — PHYSICAL EXAM
[UE] : Sensory: Intact in bilateral upper extremities [Rad] : radial 2+ and symmetric bilaterally [Normal RUE] : Right Upper Extremity: No scars, rashes, lesions, ulcers, skin intact [Normal LUE] : Left Upper Extremity: No scars, rashes, lesions, ulcers, skin intact [Normal Touch] : sensation intact for touch [Normal] : Oriented to person, place, and time, insight and judgement were intact and the affect was normal [de-identified] : Shoulders\par No edema, ecchymosis, erythema,.\par There is palpable angular deformity of the distal left humerus but no tenderness in this location.\par Fracture appears to have healed in mild varus and internal rotation.\par Left shoulder range of motion actively is with about 145 degrees forward elevation which she states is about normal.  With the arm at the side there is about 10 degrees external rotation and internal rotation is to L4.\par Abduction to 90 degrees.\par Enrico mild pain at extremes of motion on the left shoulder which is not normal for him.  R rotator cuff strength testing is with about 5 -/5 supraspinatus and internal and external rotation.  Biceps and triceps have good strength.\par \par Normal neurovascular exam distally\par \par Left knee\par No effusion.\par Range of motion 0 to 135 degrees.\par No tenderness.\par Ia Lachman, normal varus and valgus laxity.  Normal anterior and posterior drawer.\par No clicking is reproduced with range of motion. [de-identified] : \par X-rays of the left shoulder show what looks like an united old healed fracture in the distal humeral shaft with some angulation and there is a healed fracture of the greater tuberosity with some displacement that is fully healed.Some posttraumatic change.  Acromial spur.No acute fractures

## 2021-03-24 NOTE — HISTORY OF PRESENT ILLNESS
[de-identified] : Mr. Zhao is a 60-year-old gentleman who comes in with pain in his left shoulder that started January 20, 2021.  He slipped on ice and fell back landing on the back of his left elbow.  Later he had increasing pain in the left shoulder.  There was no bruising or swelling in the shoulder or arm.  Since then he has had some ongoing pain.  Pain is more mild now to 0 out of 10 at rest and about 2-3 out of 10 with certain movements.  It can hurt raising his arm or sleeping on his left side.  It can wake him up at night.  Pain can be stabbing.  Occasionally he will take Tylenol.\par He has limited motion in the left shoulder because of the fractures sustained in 1995 and a fall parachuting.  He had a distal humeral shaft fracture and good proximal humerus fractures.  The fractures are healed without surgery.  He was not having pain in the shoulder but could not lift it fully.  He does not feel like he has lost much motion right now compared to his baseline but the pain is different.\par He also is wondering about his left knee where he has some painless clicking going on recently.  There has not been any knee pain or swelling or injury.  He has normal function in the knee and just will click sometimes.  Often it happens when he has been practicing his guitar and standing for a while and then he moves and there is a click.

## 2021-04-28 ENCOUNTER — OUTPATIENT (OUTPATIENT)
Dept: OUTPATIENT SERVICES | Facility: HOSPITAL | Age: 61
LOS: 1 days | Discharge: HOME | End: 2021-04-28
Payer: COMMERCIAL

## 2021-04-28 ENCOUNTER — RESULT REVIEW (OUTPATIENT)
Age: 61
End: 2021-04-28

## 2021-04-28 DIAGNOSIS — M67.912 UNSPECIFIED DISORDER OF SYNOVIUM AND TENDON, LEFT SHOULDER: ICD-10-CM

## 2021-04-28 DIAGNOSIS — Z98.890 OTHER SPECIFIED POSTPROCEDURAL STATES: Chronic | ICD-10-CM

## 2021-04-28 PROCEDURE — 73221 MRI JOINT UPR EXTREM W/O DYE: CPT | Mod: 26,LT

## 2021-05-05 PROBLEM — M75.102 ROTATOR CUFF TEAR, LEFT: Status: ACTIVE | Noted: 2021-05-05
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
## 2021-05-05 PROBLEM — M67.912 TENDINOPATHY OF LEFT ROTATOR CUFF: Status: ACTIVE | Noted: 2021-03-24

## 2021-05-05 PROBLEM — S42.342P: Status: ACTIVE | Noted: 2021-03-24

## 2021-05-05 PROBLEM — S42.255P: Status: ACTIVE | Noted: 2021-03-24

## 2021-05-07 ENCOUNTER — APPOINTMENT (OUTPATIENT)
Dept: ORTHOPEDIC SURGERY | Facility: CLINIC | Age: 61
End: 2021-05-07
Payer: COMMERCIAL

## 2021-05-07 VITALS
HEIGHT: 67 IN | WEIGHT: 150 LBS | BODY MASS INDEX: 23.54 KG/M2 | SYSTOLIC BLOOD PRESSURE: 124 MMHG | DIASTOLIC BLOOD PRESSURE: 70 MMHG | HEART RATE: 70 BPM

## 2021-05-07 DIAGNOSIS — S42.255P: ICD-10-CM

## 2021-05-07 DIAGNOSIS — M75.102 UNSPECIFIED ROTATOR CUFF TEAR OR RUPTURE OF LEFT SHOULDER, NOT SPECIFIED AS TRAUMATIC: ICD-10-CM
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
## 2021-05-07 DIAGNOSIS — S42.342P: ICD-10-CM

## 2021-05-07 DIAGNOSIS — M67.912 UNSPECIFIED DISORDER OF SYNOVIUM AND TENDON, LEFT SHOULDER: ICD-10-CM

## 2021-05-07 PROCEDURE — 99072 ADDL SUPL MATRL&STAF TM PHE: CPT

## 2021-05-07 PROCEDURE — 99214 OFFICE O/P EST MOD 30 MIN: CPT

## 2021-05-07 NOTE — ASSESSMENT
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  He will do a little more physical therapy and then continue with the exercises on his own.  Heat and ice as needed.\par Ibuprofen as needed.\par I think ultimately if he were to lose function and have my chronic pain and loss of motion then I would recommend reverse shoulder replacement.  I referred him to one of my colleagues who does the surgery but I think if he gets a lot better with the therapy and exercises then it would be best to leave well enough alone.  He has had chronic limitations and really seems to be back to his baseline after doing the therapy and with time.  He can do the exercises on his own in the long run and try to maintain the motion and strength.\par Follow-up as needed

## 2021-05-07 NOTE — PHYSICAL EXAM
[UE] : Sensory: Intact in bilateral upper extremities [Rad] : radial 2+ and symmetric bilaterally [Normal RUE] : Right Upper Extremity: No scars, rashes, lesions, ulcers, skin intact [Normal LUE] : Left Upper Extremity: No scars, rashes, lesions, ulcers, skin intact [Normal Touch] : sensation intact for touch [Normal] : Oriented to person, place, and time, insight and judgement were intact and the affect was normal [de-identified] : Shoulders\par No edema, ecchymosis, erythema,.\par There is palpable angular deformity of the distal left humerus but no tenderness in this location.\par Fracture appears to have healed in mild varus and internal rotation.\par Left shoulder range of motion actively is with about 155 degrees forward elevation. With the arm at the side there is about 10 degrees external rotation and internal rotation is to L1.\par Abduction to 90 degrees.\par Motor mild pain at extremes of motion on the left shoulder which is not normal for him.  L rotator cuff strength testing is with about 5-/5 supraspinatus w/ shoulder shrug and 5/5 internal and 5-/5 external rotation.  Biceps and triceps have good strength.\par Normal neurovascular exam distally\par  [de-identified] : \par MRI of the left shoulder April 29, 2021  was reviewed with the patient.

## 2021-08-13 ENCOUNTER — APPOINTMENT (OUTPATIENT)
Dept: ORTHOPEDIC SURGERY | Facility: CLINIC | Age: 61
End: 2021-08-13
Payer: COMMERCIAL

## 2021-08-13 DIAGNOSIS — M19.012 PRIMARY OSTEOARTHRITIS, LEFT SHOULDER: ICD-10-CM

## 2021-08-13 DIAGNOSIS — M75.102 UNSPECIFIED ROTATOR CUFF TEAR OR RUPTURE OF LEFT SHOULDER, NOT SPECIFIED AS TRAUMATIC: ICD-10-CM

## 2021-08-13 DIAGNOSIS — M12.812 UNSPECIFIED ROTATOR CUFF TEAR OR RUPTURE OF LEFT SHOULDER, NOT SPECIFIED AS TRAUMATIC: ICD-10-CM

## 2021-08-13 DIAGNOSIS — M12.812 OTHER SPECIFIC ARTHROPATHIES, NOT ELSEWHERE CLASSIFIED, LEFT SHOULDER: ICD-10-CM

## 2021-08-13 PROCEDURE — 99214 OFFICE O/P EST MOD 30 MIN: CPT

## 2021-08-16 PROBLEM — M75.102 ROTATOR CUFF TEAR ARTHROPATHY, LEFT: Status: ACTIVE | Noted: 2021-08-16

## 2021-08-16 PROBLEM — M12.812 ROTATOR CUFF ARTHROPATHY OF LEFT SHOULDER: Status: ACTIVE | Noted: 2021-05-07

## 2021-10-10 ENCOUNTER — FORM ENCOUNTER (OUTPATIENT)
Age: 61
End: 2021-10-10

## 2021-10-11 ENCOUNTER — OUTPATIENT (OUTPATIENT)
Dept: OUTPATIENT SERVICES | Facility: HOSPITAL | Age: 61
LOS: 1 days | End: 2021-10-11
Payer: COMMERCIAL

## 2021-10-11 ENCOUNTER — APPOINTMENT (OUTPATIENT)
Dept: CARDIOTHORACIC SURGERY | Facility: CLINIC | Age: 61
End: 2021-10-11
Payer: COMMERCIAL

## 2021-10-11 VITALS
HEART RATE: 62 BPM | OXYGEN SATURATION: 96 % | TEMPERATURE: 96.7 F | SYSTOLIC BLOOD PRESSURE: 123 MMHG | WEIGHT: 152 LBS | BODY MASS INDEX: 23.86 KG/M2 | DIASTOLIC BLOOD PRESSURE: 78 MMHG | RESPIRATION RATE: 17 BRPM | HEIGHT: 67 IN

## 2021-10-11 DIAGNOSIS — I63.9 CEREBRAL INFARCTION, UNSPECIFIED: ICD-10-CM

## 2021-10-11 DIAGNOSIS — Z98.890 OTHER SPECIFIED POSTPROCEDURAL STATES: Chronic | ICD-10-CM

## 2021-10-11 DIAGNOSIS — Z87.74 PERSONAL HISTORY OF (CORRECTED) CONGENITAL MALFORMATIONS OF HEART AND CIRCULATORY SYSTEM: ICD-10-CM

## 2021-10-11 PROCEDURE — 93306 TTE W/DOPPLER COMPLETE: CPT | Mod: 26

## 2021-10-11 PROCEDURE — 99213 OFFICE O/P EST LOW 20 MIN: CPT

## 2021-10-11 PROCEDURE — 93306 TTE W/DOPPLER COMPLETE: CPT

## 2021-10-12 NOTE — HISTORY OF PRESENT ILLNESS
[FreeTextEntry1] : 61 year old male a history of hypertension, history of PE, history of CVA, patent foramen ovale s/p PFO closure on 09/26/2017 who presents for follow up. \par \par Since his last visit, the patient is feeling well.  He denies chest pain, shortness of breath at rest or with exertion, palpitations, dizziness, syncope, orthopnea, PND, or LE edema.  He denies headaches or stroke like symptoms.

## 2022-10-09 ENCOUNTER — FORM ENCOUNTER (OUTPATIENT)
Age: 62
End: 2022-10-09

## 2022-10-10 ENCOUNTER — APPOINTMENT (OUTPATIENT)
Dept: CARDIOTHORACIC SURGERY | Facility: CLINIC | Age: 62
End: 2022-10-10

## 2022-10-10 ENCOUNTER — OUTPATIENT (OUTPATIENT)
Dept: OUTPATIENT SERVICES | Facility: HOSPITAL | Age: 62
LOS: 1 days | End: 2022-10-10
Payer: COMMERCIAL

## 2022-10-10 DIAGNOSIS — Z98.890 OTHER SPECIFIED POSTPROCEDURAL STATES: Chronic | ICD-10-CM

## 2022-10-10 DIAGNOSIS — Q21.1 ATRIAL SEPTAL DEFECT: ICD-10-CM

## 2022-10-10 DIAGNOSIS — Q21.10 ATRIAL SEPTAL DEFECT, UNSPECIFIED: ICD-10-CM

## 2022-10-10 PROCEDURE — 93005 ELECTROCARDIOGRAM TRACING: CPT

## 2022-10-10 PROCEDURE — 93306 TTE W/DOPPLER COMPLETE: CPT | Mod: 26

## 2022-10-10 PROCEDURE — 99214 OFFICE O/P EST MOD 30 MIN: CPT

## 2022-10-10 PROCEDURE — 93306 TTE W/DOPPLER COMPLETE: CPT

## 2022-10-10 PROCEDURE — 93010 ELECTROCARDIOGRAM REPORT: CPT | Mod: NC

## 2022-10-12 NOTE — HISTORY OF PRESENT ILLNESS
[FreeTextEntry1] : 61 year old male a history of hypertension, history of PE, history of CVA, patent foramen ovale s/p PFO closure on 09/26/2017 who presents for follow up with a repeat ECHO.\par \par TTE 10/10/22: atrial septal occluder well seated with no signficant interatrial shunt by color flow doppler, injection of agitated saline reveals bubbles in the left heart within 3 heart beats consistent with mild residual shunting, no significant valvular disease.\par \par The patient had asked to come in earlier than originally planned due to an episode of fatigue while cycling. Patient reports that he has not been as active this year, as he usually is. He went cycling and had an episode where he felt like he could not cycle anymore secondary to fatigue. He reports that he did not have other episodes and is able to still cycle and run currently. He believes he needs to increase his endurance but wanted to make sure there was nothing else going on. Denies chest pain, SOB, STEVEN, dizziness, pre-syncope, LE edema, orthopnea.

## 2023-01-24 NOTE — PHYSICAL EXAM
[Normal Appearance] : normal appearance [Normal Conjunctiva] : the conjunctiva exhibited no abnormalities [General Appearance - In No Acute Distress] : no acute distress [Normal Jugular Venous V Waves Present] : normal jugular venous V waves present 36.7 [Normal Jugular Venous A Waves Present] : normal jugular venous A waves present [Respiration, Rhythm And Depth] : normal respiratory rhythm and effort [Exaggerated Use Of Accessory Muscles For Inspiration] : no accessory muscle use [Auscultation Breath Sounds / Voice Sounds] : lungs were clear to auscultation bilaterally [Murmurs] : no murmurs present [Heart Sounds] : normal S1 and S2 [Abdomen Tenderness] : non-tender [Abdomen Soft] : soft [Abnormal Walk] : normal gait [Cyanosis, Localized] : no localized cyanosis [Nail Clubbing] : no clubbing of the fingernails [Skin Color & Pigmentation] : normal skin color and pigmentation [] : no rash [Oriented To Time, Place, And Person] : oriented to person, place, and time

## 2023-02-16 ENCOUNTER — NON-APPOINTMENT (OUTPATIENT)
Age: 63
End: 2023-02-16

## 2023-02-21 ENCOUNTER — NON-APPOINTMENT (OUTPATIENT)
Age: 63
End: 2023-02-21

## 2023-07-14 ENCOUNTER — NON-APPOINTMENT (OUTPATIENT)
Age: 63
End: 2023-07-14

## 2023-07-14 ENCOUNTER — APPOINTMENT (OUTPATIENT)
Dept: NEUROLOGY | Facility: CLINIC | Age: 63
End: 2023-07-14
Payer: COMMERCIAL

## 2023-07-14 VITALS
OXYGEN SATURATION: 95 % | HEART RATE: 55 BPM | DIASTOLIC BLOOD PRESSURE: 72 MMHG | BODY MASS INDEX: 24.8 KG/M2 | SYSTOLIC BLOOD PRESSURE: 112 MMHG | HEIGHT: 67 IN | TEMPERATURE: 97.6 F | WEIGHT: 158 LBS

## 2023-07-14 DIAGNOSIS — G43.109 MIGRAINE WITH AURA, NOT INTRACTABLE, W/OUT STATUS MIGRAINOSUS: ICD-10-CM

## 2023-07-14 DIAGNOSIS — R41.3 OTHER AMNESIA: ICD-10-CM

## 2023-07-14 DIAGNOSIS — I63.9 CEREBRAL INFARCTION, UNSPECIFIED: ICD-10-CM

## 2023-07-14 PROCEDURE — 99215 OFFICE O/P EST HI 40 MIN: CPT

## 2023-07-14 RX ORDER — PSYLLIUM HUSK 0.4 G
CAPSULE ORAL
Refills: 0 | Status: DISCONTINUED | COMMUNITY
End: 2023-07-14

## 2023-07-14 NOTE — PHYSICAL EXAM
[FreeTextEntry1] : The patient is alert and oriented x3, follows commands, and is able to participate fully in the history taking.\par Speech is normal with no evidence of dysarthria.\par Memory is intact: Immediate recall 3 out of 3, short-term 3 out of 3, remote memory intact.\par Cranial nerves II through XII intact.\par Motor exam: Upper and lower extremities 5 out of 5 power, normal tone. No abnormal movements noted.\par Sensory exam: Intact to light touch and pinprick. Romberg negative.\par Coordination and vestibular exam: Finger to nose intact, no evidence of ataxia, nystagmus and no vestibular symptoms elicited with head turning during ambulation.\par Gait: Normal gait walking without a cane. \par Reflexes: One to 2+ in upper and lower extremities. No pathological reflexes. Downgoing toes.

## 2023-07-14 NOTE — HISTORY OF PRESENT ILLNESS
[FreeTextEntry1] : Patient is a 63 year old patient who has a PMH of HTN, PE, CVA, PFO s/p closure on 9/2017 presenting for follow up of migraines. Patient was last seen in 2020.\par \par Patient states that he had an episode of vertigo where he felt like he was floating in space for 10 seconds in 11/2022 and went into the ER and they ruled out a stroke and dx him with Vertigo. Patient states that he had the new COVID vaccine shortly before then.\par \par Otherwise, patient denies any vertigo or new stroke-like symptoms since last visit.\par \par Patient continues to follow up with Dr. Keating annually. Patient last saw PCP in January and had annual labs done then. Patient still taking all of his medications and tolerating well.\par \par In terms of diet, patient has a well balanced diet eating fruits and vegetables, chicken and fish and trying to keep his calories down. Patient cycles and patient does physical therapy for his right knee for possible degenerative miniscule tear.\par \par Since the stroke, patient states that he noticed that his hearing seems louder than before and his emotions seem more reactive than before. He has seen an ENT and they stated that nothing was abnormal. His aphasia still feels like it's there. Overall, patient feels as though he's forgetting more in terms of immediate recall and may forget words, things that were told to him after 20 minutes or so.\par \par MoCa 29/30 -1 for Language

## 2023-07-14 NOTE — ASSESSMENT
[FreeTextEntry1] : Plan\par - Continue daily medication regimen\par - Neuropsych testing referral\par - Follow up in 1 year\par - Continue to follow up with Dr. Keating and PCP

## 2023-10-16 ENCOUNTER — APPOINTMENT (OUTPATIENT)
Dept: CARDIOTHORACIC SURGERY | Facility: CLINIC | Age: 63
End: 2023-10-16
Payer: COMMERCIAL

## 2023-10-16 ENCOUNTER — OUTPATIENT (OUTPATIENT)
Dept: OUTPATIENT SERVICES | Facility: HOSPITAL | Age: 63
LOS: 1 days | End: 2023-10-16
Payer: COMMERCIAL

## 2023-10-16 DIAGNOSIS — Z98.890 OTHER SPECIFIED POSTPROCEDURAL STATES: Chronic | ICD-10-CM

## 2023-10-16 DIAGNOSIS — Q21.19 OTHER SPECIFIED ATRIAL SEPTAL DEFECT: ICD-10-CM

## 2023-10-16 PROCEDURE — 93306 TTE W/DOPPLER COMPLETE: CPT

## 2023-10-16 PROCEDURE — 93306 TTE W/DOPPLER COMPLETE: CPT | Mod: 26

## 2023-10-16 PROCEDURE — 99213 OFFICE O/P EST LOW 20 MIN: CPT

## 2023-12-29 RX ORDER — AMOXICILLIN 500 MG/1
500 TABLET, FILM COATED ORAL
Qty: 4 | Refills: 3 | Status: ACTIVE | COMMUNITY
Start: 2023-12-27 | End: 1900-01-01

## 2024-03-05 RX ORDER — ASPIRIN ENTERIC COATED TABLETS 81 MG 81 MG/1
81 TABLET, DELAYED RELEASE ORAL DAILY
Qty: 90 | Refills: 0 | Status: ACTIVE | COMMUNITY
Start: 2023-02-28 | End: 1900-01-01

## 2024-04-11 ENCOUNTER — APPOINTMENT (OUTPATIENT)
Dept: NEUROLOGY | Facility: CLINIC | Age: 64
End: 2024-04-11

## 2024-04-25 ENCOUNTER — APPOINTMENT (OUTPATIENT)
Dept: NEUROLOGY | Facility: CLINIC | Age: 64
End: 2024-04-25
Payer: COMMERCIAL

## 2024-04-25 PROCEDURE — 96133 NRPSYC TST EVAL PHYS/QHP EA: CPT

## 2024-04-25 PROCEDURE — 96132 NRPSYC TST EVAL PHYS/QHP 1ST: CPT

## 2024-04-25 PROCEDURE — 96138 PSYCL/NRPSYC TECH 1ST: CPT

## 2024-04-25 PROCEDURE — 96139 PSYCL/NRPSYC TST TECH EA: CPT

## 2024-04-25 PROCEDURE — 96116 NUBHVL XM PHYS/QHP 1ST HR: CPT

## 2024-05-14 ENCOUNTER — APPOINTMENT (OUTPATIENT)
Dept: NEUROLOGY | Facility: CLINIC | Age: 64
End: 2024-05-14
Payer: COMMERCIAL

## 2024-05-14 PROCEDURE — 96133 NRPSYC TST EVAL PHYS/QHP EA: CPT | Mod: 95

## 2024-07-10 ENCOUNTER — APPOINTMENT (OUTPATIENT)
Dept: NEUROLOGY | Facility: CLINIC | Age: 64
End: 2024-07-10

## 2024-11-14 NOTE — HISTORY OF PRESENT ILLNESS
Patient came in today for monthly cimzia.   Would like Dr. Dalton to know:  Patient does note at visit that while the cimzia has helped he a lot, he notices a little decrease in effectiveness after 3 weeks. Overall feels Cimzia is a big help.     Wants to continue cimzia and can discuss further at follow up.     Future Appointments   Date Time Provider Department Center   12/12/2024 10:00 AM [facility] RN [facility]ATOLOGY [facility]   1/6/2025 12:30 PM Moses Mclaughlin MD EM PAIN EM [facility]   2/3/2025 10:30 AM Devante Bianchi MD [facility]   2/17/2025 10:30 AM Alex Hull MD [facility]   2/26[facility]2025 10[facility]30 [facility]   3/17/2025 11:00 AM [facility]        [de-identified] : Mr. Zhao is a 60-year-old gentleman who comes in for f/u for pain in his left shoulder that started January 20, 2021 when he slipped on ice and fell back landing on the back of his left elbow.  Pain is more mild now to 0 out of 10 at rest and about 2-3 out of 10 with certain movements.  It can hurt raising his arm or sleeping on his left side.  It can wake him up at night.  Pain can be stabbing.  Occasionally he will take Tylenol.\par

## 2025-03-19 RX ORDER — ASPIRIN 81 MG/1
81 TABLET, COATED ORAL DAILY
Qty: 90 | Refills: 0 | Status: ACTIVE | COMMUNITY
Start: 2025-03-19 | End: 1900-01-01

## 2025-07-09 NOTE — DISCHARGE NOTE ADULT - PATIENT PORTAL LINK FT
“You can access the [facility] Patient Portal, offered by [facility], by registering with the following website: [email or web address]” 100